# Patient Record
Sex: MALE | Race: WHITE | Employment: FULL TIME | URBAN - METROPOLITAN AREA
[De-identification: names, ages, dates, MRNs, and addresses within clinical notes are randomized per-mention and may not be internally consistent; named-entity substitution may affect disease eponyms.]

---

## 2018-01-01 ENCOUNTER — APPOINTMENT (OUTPATIENT)
Dept: CT IMAGING | Facility: CLINIC | Age: 53
End: 2018-01-01
Attending: EMERGENCY MEDICINE
Payer: COMMERCIAL

## 2018-01-01 ENCOUNTER — HOSPITAL ENCOUNTER (INPATIENT)
Facility: CLINIC | Age: 53
LOS: 37 days | Discharge: PSYCHIATRIC OR SUBSTANCE USE FACILITY | End: 2018-06-06
Attending: EMERGENCY MEDICINE | Admitting: PSYCHIATRY & NEUROLOGY
Payer: COMMERCIAL

## 2018-01-01 ENCOUNTER — HOSPITAL ENCOUNTER (INPATIENT)
Facility: CLINIC | Age: 53
LOS: 11 days | Discharge: PSYCHIATRIC OR SUBSTANCE USE FACILITY | End: 2018-04-11
Attending: EMERGENCY MEDICINE | Admitting: PSYCHIATRY & NEUROLOGY
Payer: COMMERCIAL

## 2018-01-01 VITALS
HEART RATE: 67 BPM | SYSTOLIC BLOOD PRESSURE: 111 MMHG | WEIGHT: 122.9 LBS | TEMPERATURE: 98.7 F | OXYGEN SATURATION: 96 % | DIASTOLIC BLOOD PRESSURE: 55 MMHG | BODY MASS INDEX: 20.48 KG/M2 | HEIGHT: 65 IN | RESPIRATION RATE: 16 BRPM

## 2018-01-01 VITALS
HEART RATE: 75 BPM | SYSTOLIC BLOOD PRESSURE: 116 MMHG | BODY MASS INDEX: 21.86 KG/M2 | TEMPERATURE: 98.7 F | RESPIRATION RATE: 17 BRPM | DIASTOLIC BLOOD PRESSURE: 70 MMHG | WEIGHT: 131.2 LBS | HEIGHT: 65 IN | OXYGEN SATURATION: 94 %

## 2018-01-01 DIAGNOSIS — Z72.0 TOBACCO ABUSE: ICD-10-CM

## 2018-01-01 DIAGNOSIS — R45.89 SUICIDAL BEHAVIOR: ICD-10-CM

## 2018-01-01 DIAGNOSIS — L85.3 DRY SKIN: ICD-10-CM

## 2018-01-01 DIAGNOSIS — F32.2 SEVERE SINGLE CURRENT EPISODE OF MAJOR DEPRESSIVE DISORDER, WITHOUT PSYCHOTIC FEATURES (H): ICD-10-CM

## 2018-01-01 DIAGNOSIS — S10.93XA CONTUSION OF NECK, INITIAL ENCOUNTER: ICD-10-CM

## 2018-01-01 DIAGNOSIS — F32.2 SEVERE SINGLE CURRENT EPISODE OF MAJOR DEPRESSIVE DISORDER, WITHOUT PSYCHOTIC FEATURES (H): Primary | ICD-10-CM

## 2018-01-01 DIAGNOSIS — F33.2 SEVERE RECURRENT MAJOR DEPRESSION WITHOUT PSYCHOTIC FEATURES (H): Primary | ICD-10-CM

## 2018-01-01 DIAGNOSIS — F41.9 ANXIETY: ICD-10-CM

## 2018-01-01 DIAGNOSIS — T71.162A SUICIDE ATTEMPT BY HANGING, INITIAL ENCOUNTER (H): ICD-10-CM

## 2018-01-01 LAB
ALBUMIN SERPL-MCNC: 3.6 G/DL (ref 3.4–5)
ALP SERPL-CCNC: 86 U/L (ref 40–150)
ALT SERPL W P-5'-P-CCNC: 17 U/L (ref 0–70)
AMPHETAMINES UR QL SCN: NEGATIVE
AMPHETAMINES UR QL SCN: NEGATIVE
ANION GAP SERPL CALCULATED.3IONS-SCNC: 6 MMOL/L (ref 3–14)
ANION GAP SERPL CALCULATED.3IONS-SCNC: 7 MMOL/L (ref 3–14)
APAP SERPL-MCNC: <2 MG/L (ref 10–20)
AST SERPL W P-5'-P-CCNC: 14 U/L (ref 0–45)
BARBITURATES UR QL: NEGATIVE
BARBITURATES UR QL: NEGATIVE
BASOPHILS # BLD AUTO: 0 10E9/L (ref 0–0.2)
BASOPHILS # BLD AUTO: 0 10E9/L (ref 0–0.2)
BASOPHILS NFR BLD AUTO: 0.2 %
BASOPHILS NFR BLD AUTO: 0.3 %
BENZODIAZ UR QL: NEGATIVE
BENZODIAZ UR QL: NEGATIVE
BILIRUB SERPL-MCNC: 0.5 MG/DL (ref 0.2–1.3)
BUN SERPL-MCNC: 15 MG/DL (ref 7–30)
BUN SERPL-MCNC: 9 MG/DL (ref 7–30)
CALCIUM SERPL-MCNC: 8.8 MG/DL (ref 8.5–10.1)
CALCIUM SERPL-MCNC: 9.5 MG/DL (ref 8.5–10.1)
CANNABINOIDS UR QL SCN: POSITIVE
CANNABINOIDS UR QL SCN: POSITIVE
CHLORIDE SERPL-SCNC: 102 MMOL/L (ref 94–109)
CHLORIDE SERPL-SCNC: 109 MMOL/L (ref 94–109)
CO2 SERPL-SCNC: 25 MMOL/L (ref 20–32)
CO2 SERPL-SCNC: 31 MMOL/L (ref 20–32)
COCAINE UR QL: NEGATIVE
COCAINE UR QL: NEGATIVE
CREAT SERPL-MCNC: 0.78 MG/DL (ref 0.66–1.25)
CREAT SERPL-MCNC: 1 MG/DL (ref 0.66–1.25)
DIFFERENTIAL METHOD BLD: ABNORMAL
DIFFERENTIAL METHOD BLD: NORMAL
EOSINOPHIL # BLD AUTO: 0 10E9/L (ref 0–0.7)
EOSINOPHIL # BLD AUTO: 0.1 10E9/L (ref 0–0.7)
EOSINOPHIL NFR BLD AUTO: 0.2 %
EOSINOPHIL NFR BLD AUTO: 0.8 %
ERYTHROCYTE [DISTWIDTH] IN BLOOD BY AUTOMATED COUNT: 13.8 % (ref 10–15)
ERYTHROCYTE [DISTWIDTH] IN BLOOD BY AUTOMATED COUNT: 13.8 % (ref 10–15)
ERYTHROCYTE [DISTWIDTH] IN BLOOD BY AUTOMATED COUNT: 14 % (ref 10–15)
ETHANOL SERPL-MCNC: <0.01 G/DL
GFR SERPL CREATININE-BSD FRML MDRD: 79 ML/MIN/1.7M2
GFR SERPL CREATININE-BSD FRML MDRD: >90 ML/MIN/1.7M2
GLUCOSE SERPL-MCNC: 89 MG/DL (ref 70–99)
GLUCOSE SERPL-MCNC: 96 MG/DL (ref 70–99)
HCT VFR BLD AUTO: 41.8 % (ref 40–53)
HCT VFR BLD AUTO: 41.8 % (ref 40–53)
HCT VFR BLD AUTO: 43.9 % (ref 40–53)
HGB BLD-MCNC: 14.2 G/DL (ref 13.3–17.7)
HGB BLD-MCNC: 14.7 G/DL (ref 13.3–17.7)
HGB BLD-MCNC: 15.4 G/DL (ref 13.3–17.7)
IMM GRANULOCYTES # BLD: 0 10E9/L (ref 0–0.4)
IMM GRANULOCYTES # BLD: 0.1 10E9/L (ref 0–0.4)
IMM GRANULOCYTES NFR BLD: 0.3 %
IMM GRANULOCYTES NFR BLD: 0.4 %
INTERPRETATION ECG - MUSE: NORMAL
LYMPHOCYTES # BLD AUTO: 1.3 10E9/L (ref 0.8–5.3)
LYMPHOCYTES # BLD AUTO: 1.4 10E9/L (ref 0.8–5.3)
LYMPHOCYTES NFR BLD AUTO: 16 %
LYMPHOCYTES NFR BLD AUTO: 9.7 %
MCH RBC QN AUTO: 30.9 PG (ref 26.5–33)
MCH RBC QN AUTO: 31.6 PG (ref 26.5–33)
MCH RBC QN AUTO: 31.7 PG (ref 26.5–33)
MCHC RBC AUTO-ENTMCNC: 34 G/DL (ref 31.5–36.5)
MCHC RBC AUTO-ENTMCNC: 35.1 G/DL (ref 31.5–36.5)
MCHC RBC AUTO-ENTMCNC: 35.2 G/DL (ref 31.5–36.5)
MCV RBC AUTO: 90 FL (ref 78–100)
MCV RBC AUTO: 90 FL (ref 78–100)
MCV RBC AUTO: 91 FL (ref 78–100)
MONOCYTES # BLD AUTO: 0.9 10E9/L (ref 0–1.3)
MONOCYTES # BLD AUTO: 1 10E9/L (ref 0–1.3)
MONOCYTES NFR BLD AUTO: 10.3 %
MONOCYTES NFR BLD AUTO: 7.2 %
NEUTROPHILS # BLD AUTO: 10.8 10E9/L (ref 1.6–8.3)
NEUTROPHILS # BLD AUTO: 6.5 10E9/L (ref 1.6–8.3)
NEUTROPHILS NFR BLD AUTO: 72.3 %
NEUTROPHILS NFR BLD AUTO: 82.3 %
NRBC # BLD AUTO: 0 10*3/UL
NRBC # BLD AUTO: 0 10*3/UL
NRBC BLD AUTO-RTO: 0 /100
NRBC BLD AUTO-RTO: 0 /100
OPIATES UR QL SCN: NEGATIVE
OPIATES UR QL SCN: NEGATIVE
PCP UR QL SCN: NEGATIVE
PCP UR QL SCN: NEGATIVE
PLATELET # BLD AUTO: 257 10E9/L (ref 150–450)
PLATELET # BLD AUTO: 258 10E9/L (ref 150–450)
PLATELET # BLD AUTO: 290 10E9/L (ref 150–450)
POTASSIUM SERPL-SCNC: 3.3 MMOL/L (ref 3.4–5.3)
POTASSIUM SERPL-SCNC: 4 MMOL/L (ref 3.4–5.3)
POTASSIUM SERPL-SCNC: 4.9 MMOL/L (ref 3.4–5.3)
PROT SERPL-MCNC: 6.8 G/DL (ref 6.8–8.8)
RBC # BLD AUTO: 4.59 10E12/L (ref 4.4–5.9)
RBC # BLD AUTO: 4.64 10E12/L (ref 4.4–5.9)
RBC # BLD AUTO: 4.87 10E12/L (ref 4.4–5.9)
SALICYLATES SERPL-MCNC: 4 MG/DL
SODIUM SERPL-SCNC: 139 MMOL/L (ref 133–144)
SODIUM SERPL-SCNC: 141 MMOL/L (ref 133–144)
TSH SERPL DL<=0.005 MIU/L-ACNC: 1.38 MU/L (ref 0.4–4)
TSH SERPL DL<=0.005 MIU/L-ACNC: 2.79 MU/L (ref 0.4–4)
WBC # BLD AUTO: 13.2 10E9/L (ref 4–11)
WBC # BLD AUTO: 8.5 10E9/L (ref 4–11)
WBC # BLD AUTO: 9 10E9/L (ref 4–11)

## 2018-01-01 PROCEDURE — 12400006 ZZH R&B MH INTERMEDIATE

## 2018-01-01 PROCEDURE — 25000132 ZZH RX MED GY IP 250 OP 250 PS 637: Performed by: NURSE PRACTITIONER

## 2018-01-01 PROCEDURE — 90853 GROUP PSYCHOTHERAPY: CPT

## 2018-01-01 PROCEDURE — 25000132 ZZH RX MED GY IP 250 OP 250 PS 637: Performed by: PSYCHIATRY & NEUROLOGY

## 2018-01-01 PROCEDURE — 36415 COLL VENOUS BLD VENIPUNCTURE: CPT | Performed by: PSYCHIATRY & NEUROLOGY

## 2018-01-01 PROCEDURE — 80307 DRUG TEST PRSMV CHEM ANLYZR: CPT | Performed by: PHYSICIAN ASSISTANT

## 2018-01-01 PROCEDURE — H0001 ALCOHOL AND/OR DRUG ASSESS: HCPCS

## 2018-01-01 PROCEDURE — 85025 COMPLETE CBC W/AUTO DIFF WBC: CPT | Performed by: NURSE PRACTITIONER

## 2018-01-01 PROCEDURE — 84443 ASSAY THYROID STIM HORMONE: CPT | Performed by: NURSE PRACTITIONER

## 2018-01-01 PROCEDURE — 90791 PSYCH DIAGNOSTIC EVALUATION: CPT

## 2018-01-01 PROCEDURE — 97150 GROUP THERAPEUTIC PROCEDURES: CPT | Mod: GO

## 2018-01-01 PROCEDURE — 80329 ANALGESICS NON-OPIOID 1 OR 2: CPT | Performed by: EMERGENCY MEDICINE

## 2018-01-01 PROCEDURE — 70498 CT ANGIOGRAPHY NECK: CPT

## 2018-01-01 PROCEDURE — 25000125 ZZHC RX 250: Performed by: NURSE PRACTITIONER

## 2018-01-01 PROCEDURE — 25000128 H RX IP 250 OP 636: Performed by: EMERGENCY MEDICINE

## 2018-01-01 PROCEDURE — 99223 1ST HOSP IP/OBS HIGH 75: CPT | Performed by: NURSE PRACTITIONER

## 2018-01-01 PROCEDURE — 25000125 ZZHC RX 250: Performed by: EMERGENCY MEDICINE

## 2018-01-01 PROCEDURE — 25000132 ZZH RX MED GY IP 250 OP 250 PS 637: Performed by: EMERGENCY MEDICINE

## 2018-01-01 PROCEDURE — 99285 EMERGENCY DEPT VISIT HI MDM: CPT | Mod: 25

## 2018-01-01 PROCEDURE — 80053 COMPREHEN METABOLIC PANEL: CPT | Performed by: EMERGENCY MEDICINE

## 2018-01-01 PROCEDURE — 99207 ZZC NON-BILLABLE SERV PER CHARTING: CPT | Performed by: PHYSICIAN ASSISTANT

## 2018-01-01 PROCEDURE — 36415 COLL VENOUS BLD VENIPUNCTURE: CPT | Performed by: NURSE PRACTITIONER

## 2018-01-01 PROCEDURE — 80048 BASIC METABOLIC PNL TOTAL CA: CPT | Performed by: PSYCHIATRY & NEUROLOGY

## 2018-01-01 PROCEDURE — 80307 DRUG TEST PRSMV CHEM ANLYZR: CPT | Performed by: EMERGENCY MEDICINE

## 2018-01-01 PROCEDURE — 72125 CT NECK SPINE W/O DYE: CPT

## 2018-01-01 PROCEDURE — 93010 ELECTROCARDIOGRAM REPORT: CPT | Performed by: INTERNAL MEDICINE

## 2018-01-01 PROCEDURE — 84443 ASSAY THYROID STIM HORMONE: CPT | Performed by: PSYCHIATRY & NEUROLOGY

## 2018-01-01 PROCEDURE — 84132 ASSAY OF SERUM POTASSIUM: CPT | Performed by: NURSE PRACTITIONER

## 2018-01-01 PROCEDURE — 85025 COMPLETE CBC W/AUTO DIFF WBC: CPT | Performed by: EMERGENCY MEDICINE

## 2018-01-01 PROCEDURE — 85027 COMPLETE CBC AUTOMATED: CPT | Performed by: PSYCHIATRY & NEUROLOGY

## 2018-01-01 PROCEDURE — 96374 THER/PROPH/DIAG INJ IV PUSH: CPT | Mod: 59

## 2018-01-01 PROCEDURE — 93005 ELECTROCARDIOGRAM TRACING: CPT

## 2018-01-01 PROCEDURE — 80320 DRUG SCREEN QUANTALCOHOLS: CPT | Performed by: EMERGENCY MEDICINE

## 2018-01-01 RX ORDER — LAMOTRIGINE 25 MG/1
25 TABLET ORAL DAILY
Status: DISCONTINUED | OUTPATIENT
Start: 2018-01-01 | End: 2018-01-01

## 2018-01-01 RX ORDER — LAMOTRIGINE 25 MG/1
50 TABLET ORAL ONCE
Status: COMPLETED | OUTPATIENT
Start: 2018-01-01 | End: 2018-01-01

## 2018-01-01 RX ORDER — VARENICLINE TARTRATE 0.5 MG/1
1 TABLET, FILM COATED ORAL 2 TIMES DAILY
Status: DISCONTINUED | OUTPATIENT
Start: 2018-01-01 | End: 2018-01-01 | Stop reason: HOSPADM

## 2018-01-01 RX ORDER — LAMOTRIGINE 100 MG/1
100 TABLET ORAL DAILY
Status: DISCONTINUED | OUTPATIENT
Start: 2018-01-01 | End: 2018-01-01

## 2018-01-01 RX ORDER — ACETAMINOPHEN 325 MG/1
650 TABLET ORAL EVERY 4 HOURS PRN
Status: DISCONTINUED | OUTPATIENT
Start: 2018-01-01 | End: 2018-01-01 | Stop reason: HOSPADM

## 2018-01-01 RX ORDER — QUETIAPINE FUMARATE 50 MG/1
50 TABLET, FILM COATED ORAL ONCE
Status: COMPLETED | OUTPATIENT
Start: 2018-01-01 | End: 2018-01-01

## 2018-01-01 RX ORDER — KETOROLAC TROMETHAMINE 30 MG/ML
15 INJECTION, SOLUTION INTRAMUSCULAR; INTRAVENOUS ONCE
Status: COMPLETED | OUTPATIENT
Start: 2018-01-01 | End: 2018-01-01

## 2018-01-01 RX ORDER — HYDROXYZINE HYDROCHLORIDE 25 MG/1
25 TABLET, FILM COATED ORAL EVERY 4 HOURS PRN
Status: DISCONTINUED | OUTPATIENT
Start: 2018-01-01 | End: 2018-01-01

## 2018-01-01 RX ORDER — HYDROCODONE BITARTRATE AND ACETAMINOPHEN 5; 325 MG/1; MG/1
2 TABLET ORAL ONCE
Status: COMPLETED | OUTPATIENT
Start: 2018-01-01 | End: 2018-01-01

## 2018-01-01 RX ORDER — QUETIAPINE 300 MG/1
300 TABLET, FILM COATED, EXTENDED RELEASE ORAL AT BEDTIME
Qty: 30 TABLET | Refills: 0 | Status: ON HOLD | OUTPATIENT
Start: 2018-01-01 | End: 2018-01-01

## 2018-01-01 RX ORDER — VARENICLINE TARTRATE 1 MG/1
1 TABLET, FILM COATED ORAL 2 TIMES DAILY
Qty: 60 TABLET | Refills: 0 | Status: SHIPPED | OUTPATIENT
Start: 2018-01-01

## 2018-01-01 RX ORDER — QUETIAPINE 150 MG/1
300 TABLET, FILM COATED, EXTENDED RELEASE ORAL AT BEDTIME
Status: DISCONTINUED | OUTPATIENT
Start: 2018-01-01 | End: 2018-01-01 | Stop reason: HOSPADM

## 2018-01-01 RX ORDER — VARENICLINE TARTRATE 1 MG/1
1 TABLET, FILM COATED ORAL 2 TIMES DAILY
Qty: 60 TABLET | Refills: 0 | Status: SHIPPED | OUTPATIENT
Start: 2018-01-01 | End: 2018-01-01

## 2018-01-01 RX ORDER — VARENICLINE TARTRATE 1 MG/1
1 TABLET, FILM COATED ORAL 2 TIMES DAILY
Qty: 60 TABLET | Refills: 0 | Status: ON HOLD | OUTPATIENT
Start: 2018-01-01 | End: 2018-01-01

## 2018-01-01 RX ORDER — QUETIAPINE FUMARATE 50 MG/1
50 TABLET, FILM COATED ORAL 3 TIMES DAILY PRN
Qty: 30 TABLET | Refills: 0 | Status: SHIPPED | OUTPATIENT
Start: 2018-01-01 | End: 2018-01-01

## 2018-01-01 RX ORDER — MIRTAZAPINE 45 MG/1
45 TABLET, FILM COATED ORAL AT BEDTIME
Qty: 30 TABLET | Refills: 0 | Status: SHIPPED | OUTPATIENT
Start: 2018-01-01

## 2018-01-01 RX ORDER — QUETIAPINE FUMARATE 50 MG/1
50 TABLET, FILM COATED ORAL 3 TIMES DAILY PRN
Status: DISCONTINUED | OUTPATIENT
Start: 2018-01-01 | End: 2018-01-01 | Stop reason: HOSPADM

## 2018-01-01 RX ORDER — MIRTAZAPINE 45 MG/1
45 TABLET, FILM COATED ORAL AT BEDTIME
Status: DISCONTINUED | OUTPATIENT
Start: 2018-01-01 | End: 2018-01-01 | Stop reason: HOSPADM

## 2018-01-01 RX ORDER — MIRTAZAPINE 45 MG/1
45 TABLET, FILM COATED ORAL AT BEDTIME
Qty: 30 TABLET | Refills: 0 | Status: ON HOLD | OUTPATIENT
Start: 2018-01-01 | End: 2018-01-01

## 2018-01-01 RX ORDER — QUETIAPINE 300 MG/1
300 TABLET, FILM COATED, EXTENDED RELEASE ORAL AT BEDTIME
Qty: 30 TABLET | Refills: 0 | Status: SHIPPED | OUTPATIENT
Start: 2018-01-01

## 2018-01-01 RX ORDER — ACETAMINOPHEN 500 MG
1000 TABLET ORAL EVERY 6 HOURS PRN
Status: DISCONTINUED | OUTPATIENT
Start: 2018-01-01 | End: 2018-01-01 | Stop reason: HOSPADM

## 2018-01-01 RX ORDER — QUETIAPINE FUMARATE 50 MG/1
50-100 TABLET, FILM COATED ORAL
Status: DISCONTINUED | OUTPATIENT
Start: 2018-01-01 | End: 2018-01-01 | Stop reason: HOSPADM

## 2018-01-01 RX ORDER — LAMOTRIGINE 150 MG/1
150 TABLET ORAL DAILY
Qty: 30 TABLET | Refills: 0 | Status: SHIPPED | OUTPATIENT
Start: 2018-01-01

## 2018-01-01 RX ORDER — IOPAMIDOL 755 MG/ML
70 INJECTION, SOLUTION INTRAVASCULAR ONCE
Status: COMPLETED | OUTPATIENT
Start: 2018-01-01 | End: 2018-01-01

## 2018-01-01 RX ORDER — MIRTAZAPINE 45 MG/1
45 TABLET, FILM COATED ORAL AT BEDTIME
Qty: 30 TABLET | Refills: 0 | Status: SHIPPED | OUTPATIENT
Start: 2018-01-01 | End: 2018-01-01

## 2018-01-01 RX ORDER — IBUPROFEN 600 MG/1
600 TABLET, FILM COATED ORAL ONCE
Status: COMPLETED | OUTPATIENT
Start: 2018-01-01 | End: 2018-01-01

## 2018-01-01 RX ORDER — BACITRACIN ZINC AND POLYMYXIN B SULFATE 500; 1000 [USP'U]/G; [USP'U]/G
OINTMENT TOPICAL 2 TIMES DAILY PRN
Status: DISCONTINUED | OUTPATIENT
Start: 2018-01-01 | End: 2018-01-01 | Stop reason: HOSPADM

## 2018-01-01 RX ORDER — QUETIAPINE 300 MG/1
300 TABLET, FILM COATED, EXTENDED RELEASE ORAL AT BEDTIME
Qty: 30 EACH | Refills: 0 | Status: SHIPPED | OUTPATIENT
Start: 2018-01-01 | End: 2018-01-01

## 2018-01-01 RX ORDER — QUETIAPINE FUMARATE 50 MG/1
50 TABLET, FILM COATED ORAL 3 TIMES DAILY PRN
Qty: 30 TABLET | Refills: 0 | Status: ON HOLD | OUTPATIENT
Start: 2018-01-01 | End: 2018-01-01

## 2018-01-01 RX ORDER — LORATADINE 10 MG/1
10 TABLET ORAL AT BEDTIME
Status: DISCONTINUED | OUTPATIENT
Start: 2018-01-01 | End: 2018-01-01 | Stop reason: HOSPADM

## 2018-01-01 RX ORDER — LAMOTRIGINE 25 MG/1
50 TABLET ORAL DAILY
Status: COMPLETED | OUTPATIENT
Start: 2018-01-01 | End: 2018-01-01

## 2018-01-01 RX ORDER — HYDROXYZINE HYDROCHLORIDE 25 MG/1
25 TABLET, FILM COATED ORAL EVERY 4 HOURS PRN
Status: DISCONTINUED | OUTPATIENT
Start: 2018-01-01 | End: 2018-01-01 | Stop reason: HOSPADM

## 2018-01-01 RX ADMIN — VARENICLINE TARTRATE 1 MG: 0.5 TABLET, FILM COATED ORAL at 21:39

## 2018-01-01 RX ADMIN — VARENICLINE TARTRATE 1 MG: 0.5 TABLET, FILM COATED ORAL at 21:29

## 2018-01-01 RX ADMIN — ACETAMINOPHEN 1000 MG: 500 TABLET, FILM COATED ORAL at 14:41

## 2018-01-01 RX ADMIN — VARENICLINE TARTRATE 1 MG: 0.5 TABLET, FILM COATED ORAL at 07:29

## 2018-01-01 RX ADMIN — LORATADINE 10 MG: 10 TABLET ORAL at 21:12

## 2018-01-01 RX ADMIN — VARENICLINE TARTRATE 1 MG: 0.5 TABLET, FILM COATED ORAL at 08:30

## 2018-01-01 RX ADMIN — VARENICLINE TARTRATE 1 MG: 0.5 TABLET, FILM COATED ORAL at 21:18

## 2018-01-01 RX ADMIN — MIRTAZAPINE 45 MG: 45 TABLET, FILM COATED ORAL at 22:10

## 2018-01-01 RX ADMIN — QUETIAPINE FUMARATE 300 MG: 150 TABLET, EXTENDED RELEASE ORAL at 19:00

## 2018-01-01 RX ADMIN — LAMOTRIGINE 50 MG: 25 TABLET ORAL at 07:41

## 2018-01-01 RX ADMIN — VARENICLINE TARTRATE 1 MG: 0.5 TABLET, FILM COATED ORAL at 09:37

## 2018-01-01 RX ADMIN — LORATADINE 10 MG: 10 TABLET ORAL at 21:39

## 2018-01-01 RX ADMIN — LAMOTRIGINE 50 MG: 25 TABLET ORAL at 07:39

## 2018-01-01 RX ADMIN — VARENICLINE TARTRATE 1 MG: 0.5 TABLET, FILM COATED ORAL at 07:56

## 2018-01-01 RX ADMIN — LAMOTRIGINE 50 MG: 25 TABLET ORAL at 07:36

## 2018-01-01 RX ADMIN — VARENICLINE TARTRATE 1 MG: 0.5 TABLET, FILM COATED ORAL at 07:41

## 2018-01-01 RX ADMIN — MIRTAZAPINE 45 MG: 45 TABLET, FILM COATED ORAL at 21:51

## 2018-01-01 RX ADMIN — QUETIAPINE FUMARATE 300 MG: 150 TABLET, EXTENDED RELEASE ORAL at 21:21

## 2018-01-01 RX ADMIN — VARENICLINE TARTRATE 1 MG: 0.5 TABLET, FILM COATED ORAL at 08:03

## 2018-01-01 RX ADMIN — VARENICLINE TARTRATE 1 MG: 0.5 TABLET, FILM COATED ORAL at 19:00

## 2018-01-01 RX ADMIN — QUETIAPINE FUMARATE 300 MG: 150 TABLET, EXTENDED RELEASE ORAL at 21:22

## 2018-01-01 RX ADMIN — LORATADINE 10 MG: 10 TABLET ORAL at 21:24

## 2018-01-01 RX ADMIN — VARENICLINE TARTRATE 1 MG: 0.5 TABLET, FILM COATED ORAL at 08:11

## 2018-01-01 RX ADMIN — LAMOTRIGINE 50 MG: 25 TABLET ORAL at 11:36

## 2018-01-01 RX ADMIN — QUETIAPINE FUMARATE 300 MG: 150 TABLET, EXTENDED RELEASE ORAL at 21:18

## 2018-01-01 RX ADMIN — VARENICLINE TARTRATE 1 MG: 0.5 TABLET, FILM COATED ORAL at 22:11

## 2018-01-01 RX ADMIN — SODIUM CHLORIDE 90 ML: 9 INJECTION, SOLUTION INTRAVENOUS at 20:47

## 2018-01-01 RX ADMIN — VARENICLINE TARTRATE 1 MG: 0.5 TABLET, FILM COATED ORAL at 07:57

## 2018-01-01 RX ADMIN — MIRTAZAPINE 45 MG: 45 TABLET, FILM COATED ORAL at 21:10

## 2018-01-01 RX ADMIN — MIRTAZAPINE 45 MG: 45 TABLET, FILM COATED ORAL at 20:29

## 2018-01-01 RX ADMIN — LAMOTRIGINE 100 MG: 100 TABLET ORAL at 08:03

## 2018-01-01 RX ADMIN — VARENICLINE TARTRATE 1 MG: 0.5 TABLET, FILM COATED ORAL at 08:26

## 2018-01-01 RX ADMIN — Medication 2 SPRAY: at 18:59

## 2018-01-01 RX ADMIN — IBUPROFEN 600 MG: 600 TABLET ORAL at 04:37

## 2018-01-01 RX ADMIN — Medication 2 SPRAY: at 19:30

## 2018-01-01 RX ADMIN — MIRTAZAPINE 45 MG: 45 TABLET, FILM COATED ORAL at 20:40

## 2018-01-01 RX ADMIN — VARENICLINE TARTRATE 1 MG: 0.5 TABLET, FILM COATED ORAL at 21:05

## 2018-01-01 RX ADMIN — Medication 2 SPRAY: at 14:59

## 2018-01-01 RX ADMIN — VARENICLINE TARTRATE 1 MG: 0.5 TABLET, FILM COATED ORAL at 07:38

## 2018-01-01 RX ADMIN — LORATADINE 10 MG: 10 TABLET ORAL at 20:43

## 2018-01-01 RX ADMIN — VARENICLINE TARTRATE 1 MG: 0.5 TABLET, FILM COATED ORAL at 22:03

## 2018-01-01 RX ADMIN — VARENICLINE TARTRATE 1 MG: 0.5 TABLET, FILM COATED ORAL at 22:40

## 2018-01-01 RX ADMIN — LAMOTRIGINE 25 MG: 25 TABLET ORAL at 08:19

## 2018-01-01 RX ADMIN — VARENICLINE TARTRATE 1 MG: 0.5 TABLET, FILM COATED ORAL at 21:24

## 2018-01-01 RX ADMIN — Medication 2 SPRAY: at 10:49

## 2018-01-01 RX ADMIN — VARENICLINE TARTRATE 1 MG: 0.5 TABLET, FILM COATED ORAL at 20:57

## 2018-01-01 RX ADMIN — LAMOTRIGINE 25 MG: 25 TABLET ORAL at 09:07

## 2018-01-01 RX ADMIN — QUETIAPINE FUMARATE 50 MG: 50 TABLET ORAL at 09:09

## 2018-01-01 RX ADMIN — LORATADINE 10 MG: 10 TABLET ORAL at 20:29

## 2018-01-01 RX ADMIN — MIRTAZAPINE 45 MG: 45 TABLET, FILM COATED ORAL at 22:11

## 2018-01-01 RX ADMIN — Medication 2 SPRAY: at 09:43

## 2018-01-01 RX ADMIN — LAMOTRIGINE 150 MG: 100 TABLET ORAL at 08:04

## 2018-01-01 RX ADMIN — Medication 1 SPRAY: at 16:03

## 2018-01-01 RX ADMIN — VARENICLINE TARTRATE 1 MG: 0.5 TABLET, FILM COATED ORAL at 08:18

## 2018-01-01 RX ADMIN — LAMOTRIGINE 50 MG: 25 TABLET ORAL at 07:56

## 2018-01-01 RX ADMIN — MIRTAZAPINE 45 MG: 45 TABLET, FILM COATED ORAL at 21:59

## 2018-01-01 RX ADMIN — VARENICLINE TARTRATE 1 MG: 0.5 TABLET, FILM COATED ORAL at 09:14

## 2018-01-01 RX ADMIN — ACETAMINOPHEN 1000 MG: 500 TABLET, FILM COATED ORAL at 20:02

## 2018-01-01 RX ADMIN — LAMOTRIGINE 100 MG: 100 TABLET ORAL at 09:00

## 2018-01-01 RX ADMIN — QUETIAPINE FUMARATE 50 MG: 50 TABLET ORAL at 19:20

## 2018-01-01 RX ADMIN — MIRTAZAPINE 45 MG: 45 TABLET, FILM COATED ORAL at 21:21

## 2018-01-01 RX ADMIN — LAMOTRIGINE 50 MG: 25 TABLET ORAL at 07:03

## 2018-01-01 RX ADMIN — MIRTAZAPINE 45 MG: 45 TABLET, FILM COATED ORAL at 21:24

## 2018-01-01 RX ADMIN — BACITRACIN ZINC AND POLYMYXIN B SULFATE: at 16:07

## 2018-01-01 RX ADMIN — MIRTAZAPINE 45 MG: 45 TABLET, FILM COATED ORAL at 21:38

## 2018-01-01 RX ADMIN — VARENICLINE TARTRATE 1 MG: 0.5 TABLET, FILM COATED ORAL at 08:19

## 2018-01-01 RX ADMIN — LORATADINE 10 MG: 10 TABLET ORAL at 22:11

## 2018-01-01 RX ADMIN — VARENICLINE TARTRATE 1 MG: 0.5 TABLET, FILM COATED ORAL at 21:34

## 2018-01-01 RX ADMIN — Medication 2 SPRAY: at 07:55

## 2018-01-01 RX ADMIN — QUETIAPINE FUMARATE 300 MG: 150 TABLET, EXTENDED RELEASE ORAL at 21:52

## 2018-01-01 RX ADMIN — QUETIAPINE FUMARATE 300 MG: 150 TABLET, EXTENDED RELEASE ORAL at 21:48

## 2018-01-01 RX ADMIN — Medication 1 SPRAY: at 17:09

## 2018-01-01 RX ADMIN — QUETIAPINE FUMARATE 300 MG: 150 TABLET, EXTENDED RELEASE ORAL at 21:10

## 2018-01-01 RX ADMIN — QUETIAPINE FUMARATE 50 MG: 50 TABLET ORAL at 11:24

## 2018-01-01 RX ADMIN — MIRTAZAPINE 45 MG: 45 TABLET, FILM COATED ORAL at 22:13

## 2018-01-01 RX ADMIN — Medication 2 SPRAY: at 19:10

## 2018-01-01 RX ADMIN — LAMOTRIGINE 25 MG: 25 TABLET ORAL at 07:56

## 2018-01-01 RX ADMIN — MIRTAZAPINE 45 MG: 45 TABLET, FILM COATED ORAL at 21:14

## 2018-01-01 RX ADMIN — MIRTAZAPINE 45 MG: 45 TABLET, FILM COATED ORAL at 21:12

## 2018-01-01 RX ADMIN — MIRTAZAPINE 45 MG: 45 TABLET, FILM COATED ORAL at 22:00

## 2018-01-01 RX ADMIN — VARENICLINE TARTRATE 1 MG: 0.5 TABLET, FILM COATED ORAL at 22:10

## 2018-01-01 RX ADMIN — Medication 2 SPRAY: at 12:16

## 2018-01-01 RX ADMIN — VARENICLINE TARTRATE 1 MG: 0.5 TABLET, FILM COATED ORAL at 09:16

## 2018-01-01 RX ADMIN — LORATADINE 10 MG: 10 TABLET ORAL at 21:29

## 2018-01-01 RX ADMIN — Medication 2 SPRAY: at 11:22

## 2018-01-01 RX ADMIN — IOPAMIDOL 70 ML: 755 INJECTION, SOLUTION INTRAVENOUS at 20:45

## 2018-01-01 RX ADMIN — Medication 2 SPRAY: at 21:32

## 2018-01-01 RX ADMIN — VARENICLINE TARTRATE 1 MG: 0.5 TABLET, FILM COATED ORAL at 21:51

## 2018-01-01 RX ADMIN — HYDROCODONE BITARTRATE AND ACETAMINOPHEN 2 TABLET: 5; 325 TABLET ORAL at 00:10

## 2018-01-01 RX ADMIN — ACETAMINOPHEN 650 MG: 325 TABLET, FILM COATED ORAL at 22:00

## 2018-01-01 RX ADMIN — VARENICLINE TARTRATE 1 MG: 0.5 TABLET, FILM COATED ORAL at 09:00

## 2018-01-01 RX ADMIN — VARENICLINE TARTRATE 1 MG: 0.5 TABLET, FILM COATED ORAL at 21:20

## 2018-01-01 RX ADMIN — Medication 1 SPRAY: at 10:51

## 2018-01-01 RX ADMIN — MIRTAZAPINE 45 MG: 45 TABLET, FILM COATED ORAL at 21:22

## 2018-01-01 RX ADMIN — QUETIAPINE FUMARATE 300 MG: 150 TABLET, EXTENDED RELEASE ORAL at 21:04

## 2018-01-01 RX ADMIN — QUETIAPINE FUMARATE 300 MG: 150 TABLET, EXTENDED RELEASE ORAL at 21:20

## 2018-01-01 RX ADMIN — VARENICLINE TARTRATE 1 MG: 0.5 TABLET, FILM COATED ORAL at 21:12

## 2018-01-01 RX ADMIN — VARENICLINE TARTRATE 1 MG: 0.5 TABLET, FILM COATED ORAL at 07:43

## 2018-01-01 RX ADMIN — Medication 2 SPRAY: at 07:03

## 2018-01-01 RX ADMIN — LAMOTRIGINE 50 MG: 25 TABLET ORAL at 07:33

## 2018-01-01 RX ADMIN — LORATADINE 10 MG: 10 TABLET ORAL at 20:40

## 2018-01-01 RX ADMIN — BACITRACIN ZINC AND POLYMYXIN B SULFATE: at 14:44

## 2018-01-01 RX ADMIN — MIRTAZAPINE 45 MG: 45 TABLET, FILM COATED ORAL at 22:03

## 2018-01-01 RX ADMIN — VARENICLINE TARTRATE 1 MG: 0.5 TABLET, FILM COATED ORAL at 07:30

## 2018-01-01 RX ADMIN — VARENICLINE TARTRATE 1 MG: 0.5 TABLET, FILM COATED ORAL at 09:06

## 2018-01-01 RX ADMIN — Medication 2 SPRAY: at 16:38

## 2018-01-01 RX ADMIN — VARENICLINE TARTRATE 1 MG: 0.5 TABLET, FILM COATED ORAL at 08:13

## 2018-01-01 RX ADMIN — MIRTAZAPINE 45 MG: 45 TABLET, FILM COATED ORAL at 21:28

## 2018-01-01 RX ADMIN — QUETIAPINE FUMARATE 50 MG: 50 TABLET, FILM COATED ORAL at 05:04

## 2018-01-01 RX ADMIN — LAMOTRIGINE 100 MG: 100 TABLET ORAL at 07:38

## 2018-01-01 RX ADMIN — MIRTAZAPINE 45 MG: 45 TABLET, FILM COATED ORAL at 21:05

## 2018-01-01 RX ADMIN — VARENICLINE TARTRATE 1 MG: 0.5 TABLET, FILM COATED ORAL at 21:10

## 2018-01-01 RX ADMIN — VARENICLINE TARTRATE 1 MG: 0.5 TABLET, FILM COATED ORAL at 21:47

## 2018-01-01 RX ADMIN — QUETIAPINE FUMARATE 300 MG: 150 TABLET, EXTENDED RELEASE ORAL at 22:10

## 2018-01-01 RX ADMIN — Medication 2 SPRAY: at 13:26

## 2018-01-01 RX ADMIN — VARENICLINE TARTRATE 1 MG: 0.5 TABLET, FILM COATED ORAL at 21:22

## 2018-01-01 RX ADMIN — LAMOTRIGINE 50 MG: 25 TABLET ORAL at 08:18

## 2018-01-01 RX ADMIN — Medication 2 SPRAY: at 21:30

## 2018-01-01 RX ADMIN — MIRTAZAPINE 45 MG: 45 TABLET, FILM COATED ORAL at 21:13

## 2018-01-01 RX ADMIN — QUETIAPINE FUMARATE 300 MG: 150 TABLET, EXTENDED RELEASE ORAL at 20:25

## 2018-01-01 RX ADMIN — QUETIAPINE FUMARATE 300 MG: 150 TABLET, EXTENDED RELEASE ORAL at 22:09

## 2018-01-01 RX ADMIN — VARENICLINE TARTRATE 1 MG: 0.5 TABLET, FILM COATED ORAL at 21:31

## 2018-01-01 RX ADMIN — VARENICLINE TARTRATE 1 MG: 0.5 TABLET, FILM COATED ORAL at 08:46

## 2018-01-01 RX ADMIN — Medication 2 SPRAY: at 14:54

## 2018-01-01 RX ADMIN — QUETIAPINE FUMARATE 300 MG: 150 TABLET, EXTENDED RELEASE ORAL at 21:05

## 2018-01-01 RX ADMIN — LAMOTRIGINE 25 MG: 25 TABLET ORAL at 08:27

## 2018-01-01 RX ADMIN — Medication 2 SPRAY: at 19:46

## 2018-01-01 RX ADMIN — QUETIAPINE FUMARATE 300 MG: 150 TABLET, EXTENDED RELEASE ORAL at 22:40

## 2018-01-01 RX ADMIN — LORATADINE 10 MG: 10 TABLET ORAL at 20:57

## 2018-01-01 RX ADMIN — QUETIAPINE FUMARATE 300 MG: 150 TABLET, EXTENDED RELEASE ORAL at 21:14

## 2018-01-01 RX ADMIN — VARENICLINE TARTRATE 1 MG: 0.5 TABLET, FILM COATED ORAL at 21:13

## 2018-01-01 RX ADMIN — Medication 2 SPRAY: at 14:23

## 2018-01-01 RX ADMIN — Medication 2 SPRAY: at 06:51

## 2018-01-01 RX ADMIN — QUETIAPINE FUMARATE 300 MG: 150 TABLET, EXTENDED RELEASE ORAL at 20:43

## 2018-01-01 RX ADMIN — MIRTAZAPINE 45 MG: 45 TABLET, FILM COATED ORAL at 21:39

## 2018-01-01 RX ADMIN — LORATADINE 10 MG: 10 TABLET ORAL at 20:26

## 2018-01-01 RX ADMIN — LORATADINE 10 MG: 10 TABLET ORAL at 21:47

## 2018-01-01 RX ADMIN — Medication 2 SPRAY: at 21:10

## 2018-01-01 RX ADMIN — QUETIAPINE FUMARATE 300 MG: 150 TABLET, EXTENDED RELEASE ORAL at 21:55

## 2018-01-01 RX ADMIN — Medication 2 SPRAY: at 08:20

## 2018-01-01 RX ADMIN — MIRTAZAPINE 45 MG: 45 TABLET, FILM COATED ORAL at 21:29

## 2018-01-01 RX ADMIN — VARENICLINE TARTRATE 1 MG: 0.5 TABLET, FILM COATED ORAL at 20:43

## 2018-01-01 RX ADMIN — VARENICLINE TARTRATE 1 MG: 0.5 TABLET, FILM COATED ORAL at 21:04

## 2018-01-01 RX ADMIN — LAMOTRIGINE 25 MG: 25 TABLET ORAL at 08:15

## 2018-01-01 RX ADMIN — BACITRACIN ZINC AND POLYMYXIN B SULFATE: at 09:43

## 2018-01-01 RX ADMIN — LAMOTRIGINE 50 MG: 25 TABLET ORAL at 07:43

## 2018-01-01 RX ADMIN — LAMOTRIGINE 50 MG: 25 TABLET ORAL at 07:34

## 2018-01-01 RX ADMIN — MIRTAZAPINE 45 MG: 45 TABLET, FILM COATED ORAL at 21:37

## 2018-01-01 RX ADMIN — LORATADINE 10 MG: 10 TABLET ORAL at 18:59

## 2018-01-01 RX ADMIN — VARENICLINE TARTRATE 1 MG: 0.5 TABLET, FILM COATED ORAL at 07:03

## 2018-01-01 RX ADMIN — QUETIAPINE FUMARATE 300 MG: 150 TABLET, EXTENDED RELEASE ORAL at 20:29

## 2018-01-01 RX ADMIN — Medication 2 SPRAY: at 14:36

## 2018-01-01 RX ADMIN — VARENICLINE TARTRATE 1 MG: 0.5 TABLET, FILM COATED ORAL at 20:29

## 2018-01-01 RX ADMIN — VARENICLINE TARTRATE 1 MG: 0.5 TABLET, FILM COATED ORAL at 21:59

## 2018-01-01 RX ADMIN — VARENICLINE TARTRATE 1 MG: 0.5 TABLET, FILM COATED ORAL at 21:27

## 2018-01-01 RX ADMIN — MIRTAZAPINE 45 MG: 45 TABLET, FILM COATED ORAL at 21:31

## 2018-01-01 RX ADMIN — LORATADINE 10 MG: 10 TABLET ORAL at 22:10

## 2018-01-01 RX ADMIN — LAMOTRIGINE 50 MG: 25 TABLET ORAL at 07:15

## 2018-01-01 RX ADMIN — MIRTAZAPINE 45 MG: 45 TABLET, FILM COATED ORAL at 21:25

## 2018-01-01 RX ADMIN — KETOROLAC TROMETHAMINE 15 MG: 30 INJECTION, SOLUTION INTRAMUSCULAR at 22:14

## 2018-01-01 RX ADMIN — VARENICLINE TARTRATE 1 MG: 0.5 TABLET, FILM COATED ORAL at 07:15

## 2018-01-01 RX ADMIN — MIRTAZAPINE 45 MG: 45 TABLET, FILM COATED ORAL at 21:54

## 2018-01-01 RX ADMIN — LAMOTRIGINE 25 MG: 25 TABLET ORAL at 07:52

## 2018-01-01 RX ADMIN — LORATADINE 10 MG: 10 TABLET ORAL at 21:50

## 2018-01-01 RX ADMIN — VARENICLINE TARTRATE 1 MG: 0.5 TABLET, FILM COATED ORAL at 09:18

## 2018-01-01 RX ADMIN — Medication 1 SPRAY: at 08:17

## 2018-01-01 RX ADMIN — HYDROXYZINE HYDROCHLORIDE 25 MG: 25 TABLET ORAL at 18:17

## 2018-01-01 RX ADMIN — MIRTAZAPINE 45 MG: 45 TABLET, FILM COATED ORAL at 21:47

## 2018-01-01 RX ADMIN — QUETIAPINE FUMARATE 300 MG: 150 TABLET, EXTENDED RELEASE ORAL at 22:13

## 2018-01-01 RX ADMIN — LAMOTRIGINE 50 MG: 25 TABLET ORAL at 07:52

## 2018-01-01 RX ADMIN — VARENICLINE TARTRATE 1 MG: 0.5 TABLET, FILM COATED ORAL at 06:53

## 2018-01-01 RX ADMIN — QUETIAPINE FUMARATE 300 MG: 150 TABLET, EXTENDED RELEASE ORAL at 00:11

## 2018-01-01 RX ADMIN — QUETIAPINE FUMARATE 300 MG: 150 TABLET, EXTENDED RELEASE ORAL at 21:54

## 2018-01-01 RX ADMIN — VARENICLINE TARTRATE 1 MG: 0.5 TABLET, FILM COATED ORAL at 07:52

## 2018-01-01 RX ADMIN — VARENICLINE TARTRATE 1 MG: 0.5 TABLET, FILM COATED ORAL at 07:36

## 2018-01-01 RX ADMIN — MIRTAZAPINE 45 MG: 45 TABLET, FILM COATED ORAL at 21:18

## 2018-01-01 RX ADMIN — LORATADINE 10 MG: 10 TABLET ORAL at 21:52

## 2018-01-01 RX ADMIN — LAMOTRIGINE 25 MG: 25 TABLET ORAL at 09:11

## 2018-01-01 RX ADMIN — VARENICLINE TARTRATE 1 MG: 0.5 TABLET, FILM COATED ORAL at 08:04

## 2018-01-01 RX ADMIN — LAMOTRIGINE 100 MG: 100 TABLET ORAL at 08:18

## 2018-01-01 RX ADMIN — LAMOTRIGINE 25 MG: 25 TABLET ORAL at 08:30

## 2018-01-01 RX ADMIN — QUETIAPINE FUMARATE 300 MG: 150 TABLET, EXTENDED RELEASE ORAL at 20:39

## 2018-01-01 RX ADMIN — LAMOTRIGINE 100 MG: 100 TABLET ORAL at 08:30

## 2018-01-01 RX ADMIN — VARENICLINE TARTRATE 1 MG: 0.5 TABLET, FILM COATED ORAL at 22:09

## 2018-01-01 RX ADMIN — Medication 1 SPRAY: at 08:01

## 2018-01-01 RX ADMIN — LAMOTRIGINE 100 MG: 100 TABLET ORAL at 08:11

## 2018-01-01 RX ADMIN — BACITRACIN ZINC AND POLYMYXIN B SULFATE: at 21:15

## 2018-01-01 RX ADMIN — LORATADINE 10 MG: 10 TABLET ORAL at 21:10

## 2018-01-01 RX ADMIN — VARENICLINE TARTRATE 1 MG: 0.5 TABLET, FILM COATED ORAL at 21:55

## 2018-01-01 RX ADMIN — QUETIAPINE FUMARATE 300 MG: 150 TABLET, EXTENDED RELEASE ORAL at 21:38

## 2018-01-01 RX ADMIN — Medication 2 SPRAY: at 07:29

## 2018-01-01 RX ADMIN — QUETIAPINE FUMARATE 100 MG: 50 TABLET ORAL at 09:38

## 2018-01-01 RX ADMIN — VARENICLINE TARTRATE 1 MG: 0.5 TABLET, FILM COATED ORAL at 21:54

## 2018-01-01 RX ADMIN — LORATADINE 10 MG: 10 TABLET ORAL at 21:51

## 2018-01-01 RX ADMIN — QUETIAPINE FUMARATE 300 MG: 150 TABLET, EXTENDED RELEASE ORAL at 22:00

## 2018-01-01 RX ADMIN — QUETIAPINE FUMARATE 300 MG: 150 TABLET, EXTENDED RELEASE ORAL at 21:51

## 2018-01-01 RX ADMIN — VARENICLINE TARTRATE 1 MG: 0.5 TABLET, FILM COATED ORAL at 07:33

## 2018-01-01 RX ADMIN — VARENICLINE TARTRATE 1 MG: 0.5 TABLET, FILM COATED ORAL at 08:00

## 2018-01-01 RX ADMIN — VARENICLINE TARTRATE 1 MG: 0.5 TABLET, FILM COATED ORAL at 21:48

## 2018-01-01 RX ADMIN — ACETAMINOPHEN 1000 MG: 500 TABLET, FILM COATED ORAL at 08:19

## 2018-01-01 RX ADMIN — Medication 2 SPRAY: at 08:51

## 2018-01-01 RX ADMIN — LAMOTRIGINE 50 MG: 25 TABLET ORAL at 08:11

## 2018-01-01 RX ADMIN — VARENICLINE TARTRATE 1 MG: 0.5 TABLET, FILM COATED ORAL at 20:26

## 2018-01-01 RX ADMIN — QUETIAPINE FUMARATE 300 MG: 150 TABLET, EXTENDED RELEASE ORAL at 21:25

## 2018-01-01 RX ADMIN — Medication 2 SPRAY: at 20:00

## 2018-01-01 RX ADMIN — ACETAMINOPHEN 650 MG: 325 TABLET, FILM COATED ORAL at 20:22

## 2018-01-01 RX ADMIN — VARENICLINE TARTRATE 1 MG: 0.5 TABLET, FILM COATED ORAL at 08:10

## 2018-01-01 RX ADMIN — MIRTAZAPINE 45 MG: 45 TABLET, FILM COATED ORAL at 21:20

## 2018-01-01 RX ADMIN — Medication 2 SPRAY: at 18:30

## 2018-01-01 RX ADMIN — LORATADINE 10 MG: 10 TABLET ORAL at 21:05

## 2018-01-01 RX ADMIN — QUETIAPINE FUMARATE 300 MG: 150 TABLET, EXTENDED RELEASE ORAL at 21:13

## 2018-01-01 RX ADMIN — LAMOTRIGINE 25 MG: 25 TABLET ORAL at 08:13

## 2018-01-01 RX ADMIN — QUETIAPINE FUMARATE 300 MG: 150 TABLET, EXTENDED RELEASE ORAL at 21:11

## 2018-01-01 RX ADMIN — Medication 2 SPRAY: at 22:23

## 2018-01-01 RX ADMIN — QUETIAPINE FUMARATE 300 MG: 150 TABLET, EXTENDED RELEASE ORAL at 22:11

## 2018-01-01 RX ADMIN — Medication 2 SPRAY: at 16:43

## 2018-01-01 RX ADMIN — QUETIAPINE FUMARATE 300 MG: 150 TABLET, EXTENDED RELEASE ORAL at 21:31

## 2018-01-01 RX ADMIN — QUETIAPINE FUMARATE 300 MG: 150 TABLET, EXTENDED RELEASE ORAL at 21:29

## 2018-01-01 RX ADMIN — Medication 1 SPRAY: at 17:02

## 2018-01-01 RX ADMIN — VARENICLINE TARTRATE 1 MG: 0.5 TABLET, FILM COATED ORAL at 08:07

## 2018-01-01 RX ADMIN — VARENICLINE TARTRATE 1 MG: 0.5 TABLET, FILM COATED ORAL at 00:11

## 2018-01-01 RX ADMIN — Medication 2 SPRAY: at 09:00

## 2018-01-01 RX ADMIN — MIRTAZAPINE 45 MG: 45 TABLET, FILM COATED ORAL at 20:43

## 2018-01-01 RX ADMIN — MIRTAZAPINE 45 MG: 45 TABLET, FILM COATED ORAL at 21:55

## 2018-01-01 RX ADMIN — LORATADINE 10 MG: 10 TABLET ORAL at 21:48

## 2018-01-01 RX ADMIN — VARENICLINE TARTRATE 1 MG: 0.5 TABLET, FILM COATED ORAL at 09:15

## 2018-01-01 RX ADMIN — VARENICLINE TARTRATE 1 MG: 0.5 TABLET, FILM COATED ORAL at 22:01

## 2018-01-01 RX ADMIN — VARENICLINE TARTRATE 1 MG: 0.5 TABLET, FILM COATED ORAL at 20:39

## 2018-01-01 RX ADMIN — LAMOTRIGINE 25 MG: 25 TABLET ORAL at 07:30

## 2018-01-01 RX ADMIN — Medication 2 SPRAY: at 16:21

## 2018-01-01 RX ADMIN — MIRTAZAPINE 45 MG: 45 TABLET, FILM COATED ORAL at 00:12

## 2018-01-01 RX ADMIN — VARENICLINE TARTRATE 1 MG: 0.5 TABLET, FILM COATED ORAL at 21:46

## 2018-01-01 RX ADMIN — VARENICLINE TARTRATE 1 MG: 0.5 TABLET, FILM COATED ORAL at 07:34

## 2018-01-01 RX ADMIN — QUETIAPINE FUMARATE 50 MG: 50 TABLET ORAL at 11:20

## 2018-01-01 RX ADMIN — LAMOTRIGINE 25 MG: 25 TABLET ORAL at 08:00

## 2018-01-01 RX ADMIN — LAMOTRIGINE 25 MG: 25 TABLET ORAL at 07:57

## 2018-01-01 RX ADMIN — QUETIAPINE FUMARATE 300 MG: 150 TABLET, EXTENDED RELEASE ORAL at 21:59

## 2018-01-01 RX ADMIN — VARENICLINE TARTRATE 1 MG: 0.5 TABLET, FILM COATED ORAL at 21:38

## 2018-01-01 RX ADMIN — LORATADINE 10 MG: 10 TABLET ORAL at 21:04

## 2018-01-01 RX ADMIN — VARENICLINE TARTRATE 1 MG: 0.5 TABLET, FILM COATED ORAL at 22:13

## 2018-01-01 RX ADMIN — VARENICLINE TARTRATE 1 MG: 0.5 TABLET, FILM COATED ORAL at 08:35

## 2018-01-01 RX ADMIN — MIRTAZAPINE 45 MG: 45 TABLET, FILM COATED ORAL at 20:57

## 2018-01-01 RX ADMIN — Medication 2 SPRAY: at 07:15

## 2018-01-01 RX ADMIN — QUETIAPINE FUMARATE 300 MG: 150 TABLET, EXTENDED RELEASE ORAL at 21:28

## 2018-01-01 RX ADMIN — Medication 1 SPRAY: at 08:40

## 2018-01-01 RX ADMIN — QUETIAPINE FUMARATE 300 MG: 150 TABLET, EXTENDED RELEASE ORAL at 21:47

## 2018-01-01 RX ADMIN — BACITRACIN ZINC AND POLYMYXIN B SULFATE: at 08:13

## 2018-01-01 RX ADMIN — Medication 2 SPRAY: at 07:08

## 2018-01-01 RX ADMIN — Medication 2 SPRAY: at 08:05

## 2018-01-01 RX ADMIN — MIRTAZAPINE 45 MG: 45 TABLET, FILM COATED ORAL at 21:52

## 2018-01-01 RX ADMIN — VARENICLINE TARTRATE 1 MG: 0.5 TABLET, FILM COATED ORAL at 21:14

## 2018-01-01 RX ADMIN — Medication 2 SPRAY: at 19:59

## 2018-01-01 RX ADMIN — LAMOTRIGINE 100 MG: 100 TABLET ORAL at 07:51

## 2018-01-01 RX ADMIN — MIRTAZAPINE 45 MG: 45 TABLET, FILM COATED ORAL at 21:48

## 2018-01-01 RX ADMIN — Medication 1 SPRAY: at 17:43

## 2018-01-01 RX ADMIN — LORATADINE 10 MG: 10 TABLET ORAL at 21:14

## 2018-01-01 RX ADMIN — VARENICLINE TARTRATE 1 MG: 0.5 TABLET, FILM COATED ORAL at 07:50

## 2018-01-01 RX ADMIN — LORATADINE 10 MG: 10 TABLET ORAL at 21:18

## 2018-01-01 RX ADMIN — VARENICLINE TARTRATE 1 MG: 0.5 TABLET, FILM COATED ORAL at 08:41

## 2018-01-01 RX ADMIN — QUETIAPINE FUMARATE 300 MG: 150 TABLET, EXTENDED RELEASE ORAL at 21:39

## 2018-01-01 RX ADMIN — ACETAMINOPHEN 1000 MG: 500 TABLET, FILM COATED ORAL at 20:18

## 2018-01-01 RX ADMIN — LAMOTRIGINE 25 MG: 25 TABLET ORAL at 08:11

## 2018-01-01 RX ADMIN — MIRTAZAPINE 45 MG: 45 TABLET, FILM COATED ORAL at 18:59

## 2018-01-01 RX ADMIN — Medication 2 SPRAY: at 20:37

## 2018-01-01 RX ADMIN — LAMOTRIGINE 25 MG: 25 TABLET ORAL at 08:46

## 2018-01-01 RX ADMIN — QUETIAPINE FUMARATE 300 MG: 150 TABLET, EXTENDED RELEASE ORAL at 21:34

## 2018-01-01 RX ADMIN — QUETIAPINE FUMARATE 300 MG: 150 TABLET, EXTENDED RELEASE ORAL at 22:03

## 2018-01-01 RX ADMIN — MIRTAZAPINE 45 MG: 45 TABLET, FILM COATED ORAL at 20:26

## 2018-01-01 RX ADMIN — QUETIAPINE FUMARATE 300 MG: 150 TABLET, EXTENDED RELEASE ORAL at 20:57

## 2018-01-01 RX ADMIN — QUETIAPINE FUMARATE 50 MG: 50 TABLET ORAL at 14:53

## 2018-01-01 RX ADMIN — Medication 2 SPRAY: at 08:12

## 2018-01-01 RX ADMIN — VARENICLINE TARTRATE 1 MG: 0.5 TABLET, FILM COATED ORAL at 07:40

## 2018-01-01 RX ADMIN — LORATADINE 10 MG: 10 TABLET ORAL at 21:54

## 2018-01-01 RX ADMIN — Medication 2 SPRAY: at 08:42

## 2018-01-01 RX ADMIN — VARENICLINE TARTRATE 1 MG: 0.5 TABLET, FILM COATED ORAL at 21:25

## 2018-01-01 RX ADMIN — LAMOTRIGINE 25 MG: 25 TABLET ORAL at 06:53

## 2018-01-01 RX ADMIN — VARENICLINE TARTRATE 1 MG: 0.5 TABLET, FILM COATED ORAL at 08:15

## 2018-01-01 RX ADMIN — QUETIAPINE FUMARATE 300 MG: 150 TABLET, EXTENDED RELEASE ORAL at 21:24

## 2018-01-01 RX ADMIN — MIRTAZAPINE 45 MG: 45 TABLET, FILM COATED ORAL at 21:04

## 2018-01-01 RX ADMIN — VARENICLINE TARTRATE 1 MG: 0.5 TABLET, FILM COATED ORAL at 21:52

## 2018-01-01 RX ADMIN — LORATADINE 10 MG: 10 TABLET ORAL at 22:03

## 2018-01-01 RX ADMIN — MIRTAZAPINE 45 MG: 45 TABLET, FILM COATED ORAL at 22:40

## 2018-01-01 RX ADMIN — Medication 2 SPRAY: at 09:50

## 2018-01-01 ASSESSMENT — ACTIVITIES OF DAILY LIVING (ADL)
RETIRED_EATING: 0-->INDEPENDENT
DRESS: STREET CLOTHES;INDEPENDENT
DRESS: STREET CLOTHES
GROOMING: INDEPENDENT
COMMUNICATION: 0 - UNDERSTANDS/COMMUNICATES WITHOUT DIFFICULTY
ORAL_HYGIENE: INDEPENDENT
DRESS: INDEPENDENT
TOILETING: 0-->INDEPENDENT
GROOMING: INDEPENDENT
LAUNDRY: WITH SUPERVISION
EATING: 0 - INDEPENDENT
ORAL_HYGIENE: INDEPENDENT
LAUNDRY: WITH SUPERVISION
COGNITION: 0 - NO COGNITION ISSUES REPORTED
AMBULATION: 0-->INDEPENDENT
TOILETING: 0 - INDEPENDENT
ORAL_HYGIENE: INDEPENDENT
ORAL_HYGIENE: INDEPENDENT
RETIRED_COMMUNICATION: 0-->UNDERSTANDS/COMMUNICATES WITHOUT DIFFICULTY
DRESS: STREET CLOTHES;INDEPENDENT
ORAL_HYGIENE: INDEPENDENT
DRESS: STREET CLOTHES
SWALLOWING: 0-->SWALLOWS FOODS/LIQUIDS WITHOUT DIFFICULTY
HYGIENE/GROOMING: INDEPENDENT
DRESS: INDEPENDENT
TRANSFERRING: 0 - INDEPENDENT
FALL_HISTORY_WITHIN_LAST_SIX_MONTHS: NO
DRESS: INDEPENDENT
BATHING: 0-->INDEPENDENT
ORAL_HYGIENE: INDEPENDENT
DRESS: 0-->INDEPENDENT
GROOMING: INDEPENDENT
DRESS: INDEPENDENT
DRESS: 0 - INDEPENDENT
LAUNDRY: WITH SUPERVISION
BATHING: 0 - INDEPENDENT
ORAL_HYGIENE: INDEPENDENT
LAUNDRY: WITH SUPERVISION
SWALLOWING: 0 - SWALLOWS FOODS/LIQUIDS WITHOUT DIFFICULTY
GROOMING: INDEPENDENT
ORAL_HYGIENE: INDEPENDENT
ORAL_HYGIENE: INDEPENDENT
GROOMING: INDEPENDENT
DRESS: INDEPENDENT
GROOMING: INDEPENDENT
GROOMING: INDEPENDENT
DRESS: INDEPENDENT
ORAL_HYGIENE: INDEPENDENT
GROOMING: INDEPENDENT
ORAL_HYGIENE: INDEPENDENT
GROOMING: INDEPENDENT
LAUNDRY: WITH SUPERVISION
TRANSFERRING: 0-->INDEPENDENT
GROOMING: INDEPENDENT
HYGIENE/GROOMING: INDEPENDENT
DRESS: INDEPENDENT
GROOMING: INDEPENDENT
AMBULATION: 0 - INDEPENDENT
DRESS: INDEPENDENT
LAUNDRY: WITH SUPERVISION
DRESS: INDEPENDENT

## 2018-01-01 ASSESSMENT — ENCOUNTER SYMPTOMS
ANAL BLEEDING: 0
HALLUCINATIONS: 0
CHILLS: 0
NAUSEA: 0
WEAKNESS: 0
NECK PAIN: 1
COUGH: 0
HEADACHES: 0
DYSPHORIC MOOD: 1
NUMBNESS: 0
VOMITING: 0
SHORTNESS OF BREATH: 0
HEADACHES: 1
ABDOMINAL PAIN: 0
NAUSEA: 0
SHORTNESS OF BREATH: 0
PALPITATIONS: 0

## 2018-01-01 ASSESSMENT — PAIN DESCRIPTION - DESCRIPTORS
DESCRIPTORS: SORE
DESCRIPTORS: TINGLING

## 2018-03-31 PROBLEM — R45.851 SUICIDAL IDEATION: Status: ACTIVE | Noted: 2018-01-01

## 2018-03-31 NOTE — IP AVS SNAPSHOT
MRN:[id number]                      After Visit Summary   3/31/2018    Raheem Jimenez    MRN: [id number]           Thank you!     Thank you for choosing [facility] for your care. Our goal is always to provide you with excellent care.        Patient Information     Date Of Birth          1965        Designated Caregiver       Most Recent Value    Caregiver    Will someone help with your care after discharge? no      About your hospital stay     You were admitted on:  March 31, 2018 You last received care in the:  [facility]    You were discharged on:  April 11, 2018       Who to Call     For medical emergencies, please call 911.  For non-urgent questions about your medical care, please call your primary care provider or clinic, [phone]          Attending Provider     Provider Specialty    Vita Lawrence MD Emergency Medicine    Steve Khan MD Psychiatry       Primary Care Provider Office Phone # Fax #    Julia Burgess [phone]      Further instructions from your care team       Behavioral Discharge Planning and Instructions    Summary: Admitted to hospital with suicidal ideation after relapse.    Main Diagnosis: Major depression; polysubstance use disorder     Major Treatments, Procedures and Findings:psychiatric assessment; chemical health assessment    Symptoms to Report: mood getting worse or thoughts of suicide    Lifestyle Adjustment: Sober lifestyle. Develop and follow safety plan. Follow up with co-occurring treatment program and follow their recommendations for aftercare.      Psychiatry Follow-up:     [facility]- entry date 4/11/18.  [address]   [phone] fax:[phone]    Dr. Khan   - will follow you in program and you can set up appointments with him as you prepare to discharge.     [facility]  [address], [facility]  [address]  [address]  Phone:  [phone]  Fax:  [phone]    Resources:   Crisis Intervention: 827.148.3064 or 157-670-3731 (TTY: 311.644.1518).  Call anytime for help.  National [place] on Mental Illness (www.[place].kita.org): 295.396.2490 or 670-832-2515.  Alcoholics Anonymous (www.alcoholics-anonymous.org): Check your phone book for your local chapter.  National Suicide Prevention Line (www.mentalNonWoTecc Medicalmn.org): 915-628-QDQI (6668)    General Medication Instructions:   See your medication sheet(s) for instructions.   Take all medicines as directed.  Make no changes unless your doctor suggests them.   Go to all your doctor visits.  Be sure to have all your required lab tests. This way, your medicines can be refilled on time.  Do not use any drugs not prescribed by your doctor.  Avoid alcohol.      Pending Results     No orders found from 3/29/2018 to 4/1/2018.            Statement of Approval     Ordered          04/11/18 0809  I have reviewed and agree with all the recommendations and orders detailed in this document.  EFFECTIVE NOW     Associated Diagnoses:  Severe single current episode of major depressive disorder, without psychotic features (H) [F32.2]    Approved and electronically signed by:  Jhon Alvarez MD             Admission Information     Date & Time Provider Department Dept. Phone    3/31/2018 Steve Khan MD [facility] [phone]      Your Vitals Were     Blood Pressure Pulse Temperature Respirations Height Weight    111/55 67 98.7  F (37.1  C) (Oral) 16 1.651 m (5' 5\") 55.7 kg (122 lb 14.4 oz)    Pulse Oximetry BMI (Body Mass Index)                96% 20.45 kg/m2          MyChart Information     [employer] lets you send messages to your doctor, view your test results, renew your prescriptions, schedule appointments and more. To sign up, go to [email or web address] . Click on \"Log in\" on the left side of the screen, which will take you to the Welcome " "page. Then click on \"Sign up Now\" on the right side of the page.     You will be asked to enter the access code listed below, as well as some personal information. Please follow the directions to create your username and password.     Your access code is: [email or web address]  Expires: 7/10/2018  8:13 AM     Your access code will  in 90 days. If you need help or a new code, please call your [facility] clinic or [phone].        Care EveryWhere ID     This is your Care EveryWhere ID. This could be used by other organizations to access your [facility] medical records  [id number]        Equal Access to Services     [facility] : Nenita Wright, carrie morataya, lashanda segovia, umang campbell . So [facility] [phone].    ATENCIÓN: Si habla español, tiene a kramer disposición servicios gratuitos de asistencia lingüística. Llame al [phone].    We comply with applicable federal civil rights laws and [place] laws. We do not discriminate on the basis of race, color, national origin, age, disability, sex, sexual orientation, or gender identity.               Review of your medicines      START taking        Dose / Directions    * QUEtiapine 50 MG tablet   Commonly known as:  SEROquel   Used for:  Anxiety   Replaces:  SEROQUEL XR PO        Dose:  50 mg   Take 1 tablet (50 mg) by mouth 3 times daily as needed (anxiety)   Quantity:  30 tablet   Refills:  0       * QUEtiapine 300 MG 24 hr tablet   Commonly known as:  SEROquel XR   Used for:  Severe single current episode of major depressive disorder, without psychotic features (H)        Dose:  300 mg   Take 1 tablet (300 mg) by mouth At Bedtime   Quantity:  30 tablet   Refills:  0       sodium chloride 0.65 % nasal spray   Commonly known as:  OCEAN   Used for:  Dry skin        Dose:  1-2 spray   Spray 1-2 sprays in nostril every hour as needed for other (dry nasal passages)   Quantity:  30 mL   Refills:  0       * Notice:  " This list has 2 medication(s) that are the same as other medications prescribed for you. Read the directions carefully, and ask your doctor or other care provider to review them with you.      CONTINUE these medicines which may have CHANGED, or have new prescriptions. If we are uncertain of the size of tablets/capsules you have at home, strength may be listed as something that might have changed.        Dose / Directions    * mirtazapine 45 MG tablet   Commonly known as:  REMERON   This may have changed:  medication strength   Used for:  Severe single current episode of major depressive disorder, without psychotic features (H)        Dose:  45 mg   Take 1 tablet (45 mg) by mouth At Bedtime   Quantity:  30 tablet   Refills:  0       * mirtazapine 45 MG tablet   Commonly known as:  REMERON   This may have changed:  You were already taking a medication with the same name, and this prescription was added. Make sure you understand how and when to take each.   Used for:  Severe single current episode of major depressive disorder, without psychotic features (H)        Dose:  45 mg   Take 1 tablet (45 mg) by mouth At Bedtime   Quantity:  30 tablet   Refills:  0       varenicline 1 MG tablet   Commonly known as:  CHANTIX   This may have changed:  medication strength   Used for:  Tobacco abuse        Dose:  1 mg   Take 1 tablet (1 mg) by mouth 2 times daily   Quantity:  60 tablet   Refills:  0       * Notice:  This list has 2 medication(s) that are the same as other medications prescribed for you. Read the directions carefully, and ask your doctor or other care provider to review them with you.      STOP taking     SEROQUEL XR PO   Replaced by:  QUEtiapine 50 MG tablet                Where to get your medicines      These medications were sent to [facility]/pharmacy #[id number] [place], [place] - [address] [facility]  [address] [place] [address]     Phone:  [phone]     mirtazapine 45 MG tablet         These  medications were sent to [facility] [place], [place] - [address]  [address] [place] [address]     Phone:  [phone]     mirtazapine 45 MG tablet    QUEtiapine 300 MG 24 hr tablet    QUEtiapine 50 MG tablet    varenicline 1 MG tablet         Some of these will need a paper prescription and others can be bought over the counter. Ask your nurse if you have questions.     You don't need a prescription for these medications     sodium chloride 0.65 % nasal spray                Protect others around you: Learn how to safely use, store and throw away your medicines at www.disposemymeds.org.             Medication List: This is a list of all your medications and when to take them. Check marks below indicate your daily home schedule. Keep this list as a reference.      Medications           Morning Afternoon Evening Bedtime As Needed    * mirtazapine 45 MG tablet   Commonly known as:  REMERON   Take 1 tablet (45 mg) by mouth At Bedtime   Last time this was given:  45 mg on 4/10/2018 10:40 PM                                   * mirtazapine 45 MG tablet   Commonly known as:  REMERON   Take 1 tablet (45 mg) by mouth At Bedtime   Last time this was given:  45 mg on 4/10/2018 10:40 PM                                   * QUEtiapine 50 MG tablet   Commonly known as:  SEROquel   Take 1 tablet (50 mg) by mouth 3 times daily as needed (anxiety)   Last time this was given:  50 mg on 4/9/2018  2:53 PM                                   * QUEtiapine 300 MG 24 hr tablet   Commonly known as:  SEROquel XR   Take 1 tablet (300 mg) by mouth At Bedtime   Last time this was given:  300 mg on 4/10/2018 10:40 PM                                   sodium chloride 0.65 % nasal spray   Commonly known as:  OCEAN   Spray 1-2 sprays in nostril every hour as needed for other (dry nasal passages)   Last time this was given:  2 sprays on 4/11/2018  9:50 AM                                varenicline 1 MG tablet    Commonly known as:  CHANTIX   Take 1 tablet (1 mg) by mouth 2 times daily   Last time this was given:  1 mg on 4/11/2018  7:43 AM                                      * Notice:  This list has 4 medication(s) that are the same as other medications prescribed for you. Read the directions carefully, and ask your doctor or other care provider to review them with you.

## 2018-03-31 NOTE — ED NOTES
Bed: ED17  Expected date:   Expected time:   Means of arrival:   Comments:  411  52 M psych eval  1724

## 2018-03-31 NOTE — ED PROVIDER NOTES
"  History     Chief Complaint:  Suicidal (Pt used cocaine last wk and marijuana this morning, puts knife to neck because \"everyone is out to get me\". Cousin convinces pt to stop. +stressors in life. Hx of suicide attempts/drug abuse. )       HPI   Raheem Jimenez is a 52 year old male with a medical history including depression and suicidal ideation who presents with suicidal ideation. The patient reports this morning he smoked marijuana and soon afterwards felt paranoid making him feel like everyone was out to get him. He put a razor to his neck and called his cousin for help who was able to talk him down and then preceded to call police. Patient admits to using cocaine a week ago, denies alcohol or other drug use besides marijuana today. He stopped taking is Seroquel 2 weeks ago because it made him feel sleepy. Patient reports recent stressors including going through a divorce. He voices no other areas of concern.      Allergies:  No Known Drug Allergies     Medications:    Percocet   Mitazapine  Duloextine  Seroquel, self discontinued 2 weeks ago    Past Medical History:    Past Medical History:   Diagnosis Date     Depressive disorder      Kidney stones        Past Surgical History:    Past Surgical History:   Procedure Laterality Date     KNEE SURGERY       ORTHOPEDIC SURGERY      Right rotator cuff repair and bicep tendon repain and collarbone shaved down 9/15/2011     SHOULDER SURGERY          Family History:    family history is not on file.     Social History:   reports that he quit smoking about 7 years ago. He does not have any smokeless tobacco history on file. He reports that he uses illicit drugs, including Marijuana and Cocaine. He reports that he does not drink alcohol.    PCP: Dru Rascon     Review of Systems   Constitutional: Negative for chills.   Respiratory: Negative for cough and shortness of breath.    Cardiovascular: Negative for chest pain and palpitations.   Gastrointestinal: " "Negative for abdominal pain, nausea and vomiting.   Neurological: Negative for headaches.   Psychiatric/Behavioral: Positive for dysphoric mood and suicidal ideas. Negative for hallucinations.   All other systems reviewed and are negative.    Physical Exam     Patient Vitals for the past 24 hrs:   BP Temp Temp src Heart Rate Resp SpO2 Height Weight   03/31/18 1734 136/79 98.8  F (37.1  C) Oral 94 18 97 % 1.651 m (5' 5\") 52.2 kg (115 lb)        General: Alert, interactive in no distress.   Head:  Scalp is atraumatic.  Eyes:  EOM intact. The pupils are equal, round, and reactive to light. No scleral icterus.  ENT:                                      Ears:  The external ears are normal.  Nose:  The external nose is normal.  Throat:  The oropharynx is normal. Mucus membranes are moist.                 Neck:  Normal range of motion. There is no rigidity. Trachea midline.  CV:  Regular rate and rhythm. No murmur. 2+ radial pulses.   Resp:  Breath sounds are clear bilaterally. Non-labored, no retractions or accessory muscle use.  GI:  Abdomen is soft, no distension, no tenderness.   MS:  Normal strength in all 4 extremities.   Skin:  Warm and dry, No rash or lesions noted.  Neuro:  Strength and sensation grossly intact. Gait stable. CN grossly intact.   Psych:  Awake. Alert and oriented x3. Depressed mood.   Lymph: No anterior or posterior cervical lymphadenopathy noted.        Emergency Department Course     Laboratory:  Labs Ordered and Resulted from Time of ED Arrival Up to the Time of Departure from the ED   DRUG ABUSE SCREEN 77 URINE (FL, RH, SH) - Abnormal; Notable for the following:        Result Value    Cannabinoids Qual Urine Positive (*)     All other components within normal limits        Emergency Department Course:  Past medical records, nursing notes, and vitals reviewed.  6:59 PM: I performed an exam of the patient and obtained history, as documented above.  The patient provided a urine sample here in the " emergency department. This was sent for laboratory testing, findings above.    1806: Discussed the patient with Morgan, from [facility], who evaluated the patient at bedside and agrees the patient would benefit from admission.  He discussed patient with patient's psychologist, Dr. Khan, who will accept care of the patient.  My supervising provider, Dr. Lawrence, also interviewed and examined the patient at bedside.       Findings and plan explained to the patient who consents to admission.    Morgan discussed the patient with Dr. Khan, who will admit the patient to a behavioral health bed for further monitoring, evaluation, and treatment.        Impression & Plan      Medical Decision Making:  Raheem Jimenez is a 52 year old male with medical history including suicide attempt, substance abuse, and depression presents with suicidal ideation.  Patient states that if he leaves the department he is a danger to himself and presents voluntarily.  Urine drug screen positive for cannabinoids.  He has no physical complaints and physical exam is unremarkable.  Patient was previously on Seroquel though discontinued it 2 weeks ago because he made him feel sleepy. Admits to relapsing and using cocaine 2 weeks ago. I discussed the patient with Morgan, from [facility], who agrees the patient is actively suicidal and would benefit from admission.  I discussed the patient with patient's psychiatrist, Dr. Khan, who graciously accepted care of the patient.  He will be admitted voluntarily to a mental health bed.     Diagnosis:    ICD-10-CM    1. Suicidal behavior R46.89         Disposition:   Admit        3/31/2018   Julia Tena PA-C  Supervising provider, Vita Lawrence MD Luell, Amy Jolene, PA-C  03/31/18 1911

## 2018-03-31 NOTE — IP AVS SNAPSHOT
[place] [address] [address] [place] [address]    Phone:  [phone]                                       After Visit Summary   3/31/2018    Raheem Jimenez    MRN: [id number]           After Visit Summary Signature Page     I have received my discharge instructions, and my questions have been answered. I have discussed any challenges I see with this plan with the nurse or doctor.    ..........................................................................................................................................  Patient/Patient Representative Signature      ..........................................................................................................................................  Patient Representative Print Name and Relationship to Patient    ..................................................               ................................................  Date                                            Time    ..........................................................................................................................................  Reviewed by Signature/Title    ...................................................              ..............................................  Date                                                            Time

## 2018-04-01 NOTE — PROGRESS NOTES
03/31/18 2027   Patient Belongings   Did you bring any home meds/supplements to the hospital?  No   Patient Belongings cell phone/electronics;other (see comments)   Disposition of Belongings Locker   Belongings Search Yes   Clothing Search Yes   Second Staff Gena   General Info Comment Belingings placed in locker      Hearing Aid   Glasses  Cell phone   Fit bit   Shoes with laces   Pair of socks   Sweatshirt with string  Button down shirt   Rehan shirt   Belt clip  Belt  Pair of jeans  x6 keys   Loose change  [place] Drivers license   x2 lighters  Pack of cigarettes   Empty pack of cigarettes   Winter hat   High school diploma card   $1.00 bill  Leather gloves   Tube of fixodent  Nasal spray   chap stick  Loose papers   Leather wallet     Security envelope #[id number]  [employer] Card   Visa #[id number]  Visa #[id number]  Mastercard #[id number]  [employer]   Social security card   A               Admission:  I am responsible for any personal items that are not sent to the safe or pharmacy.  [facility] is not responsible for loss, theft or damage of any property in my possession.    Signature:  _________________________________ Date: _______  Time: _____                                              Staff Signature:  ____________________________ Date: ________  Time: _____      2nd Staff person, if patient is unable/unwilling to sign:    Signature: ________________________________ Date: ________  Time: _____     Discharge:  [facility] has returned all of my personal belongings:    Signature: _________________________________ Date: ________  Time: _____                                          Staff Signature:  ____________________________ Date: ________  Time: _____

## 2018-04-01 NOTE — PHARMACY-ADMISSION MEDICATION HISTORY
Admission medication history interview status for the 3/31/2018  admission is complete. See EPIC admission navigator for prior to admission medications     Medication history source reliability:Good    Actions taken by pharmacist (provider contacted, etc):Verified all Rx medications with patient's retail pharmacy - [facility]     Additional medication history information not noted on PTA med list :None    Medication reconciliation/reorder completed by provider prior to medication history? No    Time spent in this activity: 15 minutes    Prior to Admission medications    Medication Sig Last Dose Taking? Auth Provider   MIRTAZAPINE PO Take 45 mg by mouth At Bedtime 3/30/2018 at hs Yes Unknown, Entered By History   QUEtiapine Fumarate (SEROQUEL XR PO) Take 300 mg by mouth At Bedtime ~4 weeks ago Yes Unknown, Entered By History   Varenicline Tartrate (CHANTIX PO) Take 1 mg by mouth 2 times daily 3/31/2018 at Unknown time Yes Unknown, Entered By History
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
## 2018-04-01 NOTE — PLAN OF CARE
"Problem: Depressive Symptoms  Goal: Depressive Symptoms  Signs and symptoms of listed problems will be absent or manageable.   Outcome: No Change  Blunt/flat, appears sad and depressed. Still endorses SI but no plan, contracts for safety. Visible out in ITC lounge and hallway, quiet, minimal interaction with peers but polite and respectful when interacting with staff. During 1:1, pt recounted his struggles with depression and drug use, stated he is \"feeling overwhelmed with life right now, and has felt like he would be better off dead\". Wants help. Reports neck and back pain from old fall injury. Offered to get order for Ensure or Boost, as patient is rather thin, but he declined and stated that he had an eating disorder a long time ago but his issue now is just \"eating poorly\".      "

## 2018-04-01 NOTE — PROGRESS NOTES
[facility]  Hospitalist Psychiatry Consultation         Annia Yang PA-C    Raheem Jimenez MRN# [id number]   YOB: 1965 Age: 52 year old      Date of Admission:  3/31/2018  Date of Consult: 4/1/2018         Assessment and Plan:   Raheem Jimenez is a 52 year old male who presented to the Emergency Room with suicidal thoughts/ideation and recent drug abuse (cocaine and marijuana).  He was admitted to mental health for assessment and treatment.     #Psychiatry Issues   #Drug Abuse  -admitted to the mental health unit  -medications and management per psychiatry team    #Medical Issues  -none  -labs and vitals WNL  -will sign off  -call with questions.     Annia Yang PA-C ([place])   Hospitalist  Pager: [phone]  8:22 AM  April 1, 2018            Code Status:   Full Code         Primary Care Physician:   Julia Burgess [phone]         Requesting Physician:   Dr Khan et al        Reason for Consult:   Medical Evaluation          History of Present Illness:   Raheem Jimenez is a healthy 52 year old male who presented to the ER with suicidal ideation and drug abuse.  He was admitted to the psychiatric unit for further cares.     I visited with the patient in the psych unit.  At this time she has no medical complaints; no breathing changes, SOB, CP, N/V/D, or dysuria.  Patient denies any rencent illnesses, fevers or excessive sweating/chills.             Past Medical History:     Past Medical History:   Diagnosis Date     Depressive disorder      Kidney stones                Past Surgical History:     Past Surgical History:   Procedure Laterality Date     KNEE SURGERY       ORTHOPEDIC SURGERY      Right rotator cuff repair and bicep tendon repain and collarbone shaved down 9/15/2011     SHOULDER SURGERY              Home Medications:     Prior to Admission medications    Medication Sig Last Dose Taking? Auth Provider   MIRTAZAPINE PO Take 45  "mg by mouth At Bedtime 3/30/2018 at hs Yes Unknown, Entered By History   QUEtiapine Fumarate (SEROQUEL XR PO) Take 300 mg by mouth At Bedtime ~4 weeks ago Yes Unknown, Entered By History   Varenicline Tartrate (CHANTIX PO) Take 1 mg by mouth 2 times daily 3/31/2018 at Unknown time Yes Unknown, Entered By History            Current Medications:           varenicline (CHANTIX) tablet 1 mg  1 mg Oral BID     QUEtiapine (SEROquel XR) 24 hr tablet 300 mg  300 mg Oral At Bedtime     mirtazapine (REMERON) tablet 45 mg  45 mg Oral At Bedtime     hydrOXYzine, QUEtiapine         Allergies:   No Known Allergies         Social History:   Raheem Jimenez  reports that he quit smoking about 7 years ago. He does not have any smokeless tobacco history on file. He reports that he uses illicit drugs, including Marijuana and Cocaine. He reports that he does not drink alcohol.            Family History:   No family history on file.            Review of Systems:   The 10 point Review of Systems is negative other than noted in the HPI.         Physical Exam:   Blood pressure (!) 133/96, temperature 99  F (37.2  C), temperature source Oral, resp. rate 16, height 1.651 m (5' 5\"), weight 52.4 kg (115 lb 9.6 oz), SpO2 100 %.  115 lbs 9.6 oz    GENERAL:  This is a well-nourished individual who is resting comfortably in bed.   NECK:  Supple without any lymphadenopathy or thyromegaly.   CARDIOVASCULAR:  Regular rate and rhythm, no murmurs, rubs or gallops.   LUNGS:  Clear to auscultation.  No wheezing, rhonchi or rubs.   ABDOMEN:  Positive bowel sounds, soft, nontender, nondistended.  No masses, no hepatosplenomegaly.   EXTREMITIES:  No edema, cyanosis or clubbing.  NEUROLOGIC:  Non focal  PSYCH:  Appropriate and normal affect.  Cooperative throughout the exam.          Data:   All new lab and imaging data was reviewed.   Recent Labs   Lab Test  03/31/18   2050  08/19/13   0800   10/16/11   1800   12/01/10   2150   WBC  8.5  14.6*   < >  " 17.2*   < >   --    HGB  15.4  16.4   < >  15.4   < >   --    MCV  90  88   < >  87   < >   --    PLT  290  340   < >  358   < >   --    INR   --    --    --   1.02   --   1.02    < > = values in this interval not displayed.      Recent Labs   Lab Test  03/31/18   2050  08/19/13   0800   NA  139  146*   POTASSIUM  4.9  4.0   CHLORIDE  102  106   CO2  31  21   BUN  15  22   CR  1.00  1.40*   ANIONGAP  6  20*   STEPHANI  9.5  10.6*   GLC  89  152*     Recent Labs   Lab Test  10/16/11   1800   TROPI  <0.012
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
## 2018-04-02 NOTE — PLAN OF CARE
Problem: General Plan of Care (Inpatient Behavioral)  Goal: Individualization/Patient Specific Goal (IP Behavioral)  The patient and/or their representative will achieve their patient-specific goals related to the plan of care.    The patient-specific goals include:   Decrease depression,   Decrease anxiety,   Decrease suicidal ideation,   Increase coping skills.   Outcome: No Change  Patient reports having overwhelming thought d/t upcoming criminal court appointment. He says that I feel like I want to use more drug to drawn my emotion. And then, he says the main time I want to be clean b/c this behavior is wrecking my life. If I continue this way I can lose my job at [employer]. He says getting clean with CD tx might help. This shift, he was c/o HA and neck discomfort, and after a PRN tylenol, he verbalized decreased in pain. It is also noted that patient's judgement impaired; while he was in the step down lounge, a staff member observed that patient was position himself btwn two chair, which possibly would have put him at risk for falling. Contract for safety. Please continue to monitor.

## 2018-04-02 NOTE — PROGRESS NOTES
"Patient denies suicidal ideation or hallucinations. He contracts for safety. Patient stated that he was hiding something that he wanted to share.  He states that he has been struggling with transgender.  He has been dressing up as a girl since the 3rd grade.  He continues this but only at home.  States that he is afraid to go out dressed as a women.  His wife is aware and has been aware since before they were .  He states his wife encouraged it.  He then pulled up his shirt to show the writer his belly button. He had a ring in it.  He stated that he got it about a month ago and \" normal charisma would do this\".  He is attending groups and participating well and is on board with the plan of care.   "

## 2018-04-02 NOTE — PROGRESS NOTES
"BEHAVIORAL HEALTH NUTRITION ASSESSMENT      REASON FOR ASSESSMENT:  RN consult - pt has been eating poorly for a significant amount of time and looks very thin.  Pt admitted for SI.   CURRENT DIET AND NOURISHMENT ORDER:    Information obtained from the chart.    Diet: Regular    Current Intake/Tolerance:  No intake recorded on the doc flowsheet. Patient not available/not appropriate to visit with at this time. Patient ordering adequate amounts of food and balanced meals as per review of menus.  Per nsg note, offered to order supplements but pt declined, stated he had hx of eating disorder but his issue now is just poor intake.      ANTHROPOMETRICS:  Height: 5' 5\"  Weight: 52.4 kg  BMI: 19.24 kg/m2  IBW: 61.8 kg  %IBW: 855  Weight History:   Wt Readings from Last 10 Encounters:   03/31/18 52.4 kg (115 lb 9.6 oz)   08/19/13 56.7 kg (125 lb)   10/25/12 49.9 kg (110 lb)   03/19/12 52.2 kg (115 lb)   01/22/12 63.5 kg (140 lb)   10/16/11 61.2 kg (135 lb)       LABS:  Reviewed    NUTRITION STATUS VALIDATION:  Weight status: Normal BMI    INTERVENTION:    Nutrition Diagnosis:  No nutrition diagnosis at this time.    Implementation:   Nutrition education: Per MD order if appropriate     Follow Up/Monitoring:   No need for further follow-up unless another consult received.    Rhiannon Edward RD  Pager [phone] (M-F)            [phone] (W/E & Hol)        "

## 2018-04-02 NOTE — PLAN OF CARE
Problem: General Plan of Care (Inpatient Behavioral)  Goal: Team Discussion  Team Plan:    Outcome: No Change  BEHAVIORAL TEAM DISCUSSION    Participants: Dr. Khan, Case Management, Nursing Staff, PA's   Progress: Improving  Continued Stay Criteria/Rationale: Continue stay of hospitalization  Medical/Physical:   Precautions:   Behavioral Orders   Procedures     Code 1 - Restrict to Unit     Routine Programming     As clinically indicated     Status 15     Every 15 minutes.     Plan: D/C to [facility] eventually  Rationale for change in precautions or plan: Continue stay      Problem: Patient Care Overview  Goal: Team Discussion  Team Plan:    Outcome: No Change  BEHAVIORAL TEAM DISCUSSION    Participants: Dr. Khan, Case Management, Nursing Staff, PA's   Progress: Improving  Continued Stay Criteria/Rationale: Continue stay of hospitalization  Medical/Physical:   Precautions:   Behavioral Orders   Procedures     Code 1 - Restrict to Unit     Routine Programming     As clinically indicated     Status 15     Every 15 minutes.     Plan: D/C to [facility] eventually  Rationale for change in precautions or plan: Continue stay
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
## 2018-04-03 NOTE — PLAN OF CARE
Problem: Depressive Symptoms  Goal: Depressive Symptoms  Signs and symptoms of listed problems will be absent or manageable.   Outcome: No Change  Pt presents with a flat affect and depressed mood. Pt is visible on the unit, seen socializing with peers and staff. Pt wears his potter up most of the time and complains that he is cold. Pt attended unit programing and was appropriate. While discussing the topic of suicide the pt expressed to the group that he had thoughts of cutting his neck in his employers parking lot. Pt states that he couldn't go through with it because he didn't want to leave his family behind. Pt spent time in the lounge watching a movie with peers. Pt then retreated to his room and remains asleep for the duration of the shift.

## 2018-04-03 NOTE — PLAN OF CARE
Problem: Depressive Symptoms  Goal: Depressive Symptoms  Signs and symptoms of listed problems will be absent or manageable.   Patient has some anxiety about circumstances at home , but is visible on the unit and attending groups and participating well. Pt states his wife is supportive and encouraging him to follow his Transgender feelings. Pt denies SI currently.
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
## 2018-04-04 NOTE — PLAN OF CARE
Problem: Depressive Symptoms  Goal: Depressive Symptoms  Signs and symptoms of listed problems will be absent or manageable.   Outcome: No Change  Pt has been present and pleasant in the SDU throughout the day shift. Ate most of his meals for breakfast and lunch and has been attending groups and participating accordingly. Pt is respectful to peers and staff and presents a flat, depressed affect; remains calm.

## 2018-04-04 NOTE — PLAN OF CARE
Problem: Depressive Symptoms  Goal: Depressive Symptoms  Signs and symptoms of listed problems will be absent or manageable.   Outcome: Improving  Tense. Flat/blunt affect. Social with others. Attended groups and participated. Talked joyfully about his job at [employer]. Worried about the legal trouble he is in. Expressed strong desire to do CD treatment. Denies SI. Med compliant.
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
## 2018-04-05 NOTE — PLAN OF CARE
"Problem: Depressive Symptoms  Goal: Depressive Symptoms  Signs and symptoms of listed problems will be absent or manageable.   Outcome: Improving  Quiet, visible, minimally social, respectful  and emotionally distant. Had a depressed flat affect. Denies SI. Stated. \" I feeling better than when I was admitted and just waiting to go to [facility] and I am okay with going to treatment. \". Spent most of time in lounge watching TV with others .       "
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
## 2018-04-05 NOTE — PLAN OF CARE
Problem: Depressive Symptoms  Goal: Depressive Symptoms  Signs and symptoms of listed problems will be absent or manageable.   Outcome: No Change  Pt has been present and pleasant in the SDU throughout the day shift. Attended groups and participated accordingly. Presents a flat, sad and depressed affect but remains calm and collected. Respectful to peers and staff. Ate most of his meals for breakfast and lunch. Pt had an exchange with one of the other peers regarding the shades in the lounge because it was hurting his eyes and the other pt started chest bumping him a couple times. Pt was irritable after this, but was able to collect himself. Pt has been in his room more often than not during the latter half of the day shift.

## 2018-04-05 NOTE — PLAN OF CARE
Problem: General Plan of Care (Inpatient Behavioral)  Goal: Team Discussion  Team Plan:    Outcome: No Change  BEHAVIORAL TEAM DISCUSSION    Participants: Dr. Khan, Case Management, Nursing staff, Psych associates   Progress: Continues to feel anxious, encouraged to use coping skills. Awaiting CD assessment for [facility].  Continued Stay Criteria/Rationale: Awaiting CD assessment for [facility].   Medical/Physical:   Precautions:   Behavioral Orders   Procedures     Code 1 - Restrict to Unit     Routine Programming     As clinically indicated     Status 15     Every 15 minutes.     Plan: Awaiting CD assessment for [facility].   Rationale for change in precautions or plan: Awaiting CD assessment for [facility].      Problem: Patient Care Overview  Goal: Team Discussion  Team Plan:    Outcome: No Change  BEHAVIORAL TEAM DISCUSSION    Participants: Dr. Khan, Case Management, Nursing staff, Psych associates   Progress: Continues to feel anxious, encouraged to use coping skills. Awaiting CD assessment for [facility].  Continued Stay Criteria/Rationale: Awaiting CD assessment for [facility].   Medical/Physical:   Precautions:   Behavioral Orders   Procedures     Code 1 - Restrict to Unit     Routine Programming     As clinically indicated     Status 15     Every 15 minutes.     Plan: Awaiting CD assessment for [facility].   Rationale for change in precautions or plan: Awaiting CD assessment for [facility].
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
## 2018-04-06 NOTE — PLAN OF CARE
Problem: Depressive Symptoms  Goal: Depressive Symptoms  Signs and symptoms of listed problems will be absent or manageable.   Outcome: No Change  Pt was active and cooperative within the SDU. Pt presents with a flat blunt sad affect and depressed anxious mood. Pt spent almost all day in the lounge watching tv and stretching. Pt is minimally social, and sad upon approach. Pt attended all groups in the lounge and participated appropriately. Pt stated that he's just waiting for his CD treatment so he can get discharged. Pt ate all of his food and was med compliant.

## 2018-04-06 NOTE — CONSULTS
The writer met with the patient introduced herself and her role. The patient completed an assessment. Recommendation would be inpatient/residential treatment at [facility]. The patient agreed to this. The writer will finish up the assessment and send the referral (DOMENICA, assessment) to [facility]. The writer gave the patient her business card for any questions.

## 2018-04-06 NOTE — PROGRESS NOTES
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 such as having sex with many partners, masturbating often, using pornography often? No    19. Have you ever purged, binged or restricted yourself as a way to control your weight? No    20. Have you ever believed people were spying on you, or that someone was plotting against you or trying to hurt you? No    21. Have you ever believed someone was reading your mind or could hear your thoughts or that you could actually read someone's mind or hear what another person was thinking? No    22. Have you ever believed that someone or some force outside of yourself was putting thoughts into your mind or made you act in a way that was not your usual self?  Have you ever thought you were possessed? No    23. Have you ever believed you were being sent special messages through the TV, radio, newspaper or internet?  No    24. Have you ever heard things other people couldn't hear, such as voices or other noises? No    25. Have you ever had visions when you were awake?  Or have you ever seen things other people couldn't see? No    26. Have you ever had to lie to people important to you about how much you seay? No    27. Have  you ever felt the need to bet more and more money? No    28. Have you ever attempted treatment for a gambling problem? No    29. Highest grade of school completed:  Associate degree/vocational certificate    30. Do you have any difficulties with reading, writing or calculating?  No    31. Have you ever been diagnosed with a learning disability, such as ADHD or dyslexia?  No    32. What is your preferred learning style?  by hands-on practice    33. Do you have any problems with memory impairment or problem solving?  No    34. Do you have any problems with headaches or dizziness? No    35. Have you ever been in the ?  No    36. Have you been diagnosed with traumatic brain injury or Alzheimer s?  No    37. Have you ever hit your head or been hit on the head?  No    38. Have you ever had medical treatment for an injury to your head?  No    39. Have you had any significant illness that affected your brain (brain tumor, meningitis, West Nile Virus, stroke, seizure, heart attack, near drowning or near suffocation)?  No    40. Have you ever been diagnosed with Fetal Alcohol Effects or Fetal Alcohol Syndrome?  No    41. What are your some of your personal strengths?  per pt: Not using, very responsible, people have respect for knowledged and willing to help people.     Dimension III Ratings Summary   Emotional/Behavioral/Cognitive - The placing authority must use the criteria in Dimension III to determine a client s emotional, behavioral, and cognitive conditions and complications.   RISK DESCRIPTIONS - Severity ratin Client has difficulty with impulse control and lacks coping skills. Client has thoughts of suicide or harm to others without means; however, the thoughts may interfere with participation in some treatment activities. Client has difficulty functioning in significant life areas. Client has moderate symptoms of emotional, behavioral, or cognitive problems. Client is able to participate in most  treatment activities.    REASONS SEVERITY WAS ASSIGNED - What current issues might with thinking, feelings or behavior pose barriers to participation in a treatment program? What coping skills or other assets does the person have to offset those issues? Are these problems that can be initially accommodated by a treatment provider? If not, what specialized skills or attributes must a provider have?    Pt reported and EMR collateral confirmed pt has mental health diagnosis. Pt reported he was not taking his medications as prescribed and directed prior to admission for mental health symptoms. Pt reported past therapy but denied current. Pt reported past trauma/abuse issues. Pt reported multiple environmental stressors. Pt was admitted to [facility] due to suicide attempt and ideation. Pt reported past suicide attempts. Pt denied SI, intent, plans, or means at the time of the assessment.         DIMENSION IV - Readiness to Change     1. What is your motivation for participating in this evaluation today?    Per pt: I want to get my life back because I have done it before, I just stumbled and feel and cannot quit on my own.     2. What problematic behaviors have you engaged in when using alcohol or other drugs that could be hazardous to you or others (i.e. driving a car/motorcycle/boat, operating machinery, unsafe sex, IV drug use, sharing needles, etc.)  (DSM-5: Sx #8)    Per pt: driving and working     3. If applicable, when did you first think you had a problem with your alcohol or other drug use?    Per pt: couple weeks ago.     4. Who in your life has shared concerns with you about your use of alcohol or other drugs?    Per pt: my wife probably my family too, even though they knew, I was isolating. Went back in my shell and stayed in my own little world.     5. Are there any changes you have made or plan to make regarding how you had been using alcohol or other drugs?    Yes, explain: per pt: try to find the treatment  center to get my back in line and to deal with pain.     Dimension IV Ratings Summary   Readiness for Change - The placing authority must use the criteria in Dimension IV to determine a client s readiness for change.   RISK DESCRIPTIONS - Severity ratin Client is motivated with active reinforcement, to explore treatment and strategies for change, but ambivalent about illness or need for change.    REASONS SEVERITY WAS ASSIGNED - (What information did the person provide that supports your assessment of his or her readiness to change? How aware is the person of problems caused by continued use? How willing is she or he to make changes? What does the person feel would be helpful? What has the person been able to do without help?)      Pt reported his motivation for the assessment was to get his life back on track. Pt reported he knew his use was a problem a couple of weeks ago. Pt reported his wife and family expressed concern about his use. Pt reported changes he is willing to make is treatment to get his life back on track and to positively cope with pain. Pt appears to be in the contemplative stage of change evidenced by his verbal report.        DIMENSION V - Relapse, Continued Use and Continued Problem Potential     1. If you have had previous periods of sobriety, when was your longest period of sobriety and what were you doing at that time that was supporting your sobriety?  (DSM-5: Sx #2)    Per pt: Got back into using because of pain and ran out of pain pills, two neck surgeries almost in a year and half of each other. Have been sober from alcohol since , prior was sober from marijuana from 5 years ago. Using but not heavily until the last three years. Living with parents and did not want to let them down, then moved out after got , then started using a little bit here and there. Been using it periodically but not very often, co-worker called me for a ride to go  cocaine and he offered me  two lines and I took it.     2. Within the past 30 days, on a scale from 0-10 (0 = having no cravings at all and 10 = having very strong cravings to use alcohol or other drugs) what number would you assign to your cravings? (DSM-5: Sx #4)     Per pt:  Marijuana  10/10  Cocaine   0/10    3. Can you identify any specific reasons or specific triggers that contribute to you being more likely to consume alcohol or other drugs? (DSM-5: Sx #4)    Yes, explain: per pt:pain and depression, cocaine was available. Beat myself up for using the more I use. Until I did not know I was coming or going, could not remember hardly anything or wife was telling me. Transgender and coming out and that was another trigger for using, came out in 1996 and forced back in closet and had to move back home, dad said we cannot have that shit here and hide feelings again. Wife was encouraging it all of sudden asked for a divorce out of the blue. Told me I need to work on that and be free of who I felt like I should be.     4. Have you been treated for alcohol/other substance use disorder? (DSM-5: Sx #2)    4B. Number of times(lifetime) (over what period) 4.   4C. Number of times completed treatment (lifetime) 3.   4D. During the past three years have you participated in outpatient and/or residential?  No    Per EMR hx: reported in past 6 treatments in lifetime.     5. Support group participation: Have you/do you attend 12-step or other support group meetings? How recently? What was your experience? Are you willing to restart? If the person has not participated, is he or she willing?  (DSM-5: Sx #2)    The patient denied having any recent attendance at 12-step or other support group meetings. Not in 8 years. Another trigger of using not going to AA Meetings.     6. Do you drink alcohol or use other drugs in larger amounts than intended or over a longer period of time than was intended?  (DSM-5: Sx #1)    Yes, explain: daily use of heavy amount of  marijuana. Cocaine use periodically. Opiates during prescription.     7. Do you spend a great deal of time engaged in activities necessary to obtain alcohol or other drugs, a great deal of time using alcohol or other drugs, or a great deal of time recovering from alcohol or other drug use?  (DSM-5: Sx #3)    Yes, explain: same as above. Throughout day heavy use of marijuana.     Dimension V Ratings Summary   Relapse/Continued Use/Continued problem potential - The placing authority must use the criteria in Dimension V to determine a client s relapse, continued use, and continued problem potential.   RISK DESCRIPTIONS - Severity ratin No awareness of the negative impact of mental health problems or substance abuse. No coping skills to arrest mental health or addiction illnesses, or prevent relapse.    REASONS SEVERITY WAS ASSIGNED - (What information did the person provide that indicates his or her understanding of relapse issues? What about the person s experience indicates how prone he or she is to relapse? What coping skills does the person have that decrease relapse potential?)      Pt reported periods of sobriety and what was helpful to abstain was living with his parents and not wanting to disappoint them. Pt reported what lead back to use was running out of pain prescription, pain, moving in with wife, and peer offered him drug. Pt reported cravings for marijuana. Pt reported four treatments in his lifetime in which he completed 3. Per EMR hx pt may have attended more treatment programs. Pt denied any recent attendance of sober support group meetings but reported history of attendance. Pt appears to be at high risk for continued use as he appears to lack daily sober living skills/relapse skills.        DIMENSION VI - Recovery Environment     1. Are you employed or attending school?    Per pt:Full time work, [employer], they put me on leave of absence. So I can continue to have insurance.  Not sure if they will  keep me after.     2. If working or a student, are you able to function appropriately in that setting?     Yes    3. Has your job and/or school work been negatively impacted by your use of alcohol of other drugs?  (DSM-5: Sx #5 & Sx #7)    No    4. How would you describe your current finances?  In serious debt     5. Are you having financial problems, such as money being tight, living paycheck to paycheck, having unpaid or late bills, having significant debt, a history of bankruptcy, or IRS problems?    Yes, please explain:per pt: lot of unpaid bills.      6. Describe a typical day; evening for you. Work, school, social, leisure activities, volunteer, exercise, spiritual practices or other daily tasks.    Per pt: getting up smoking weed, going to work, taking a break, smoking weed, coming home, smoking weed and then watching tv and dinner and then go downstairs smoke weed. Before bed smoked weed, when I got up.     7. Have you reduced or discontinued recreational activities, hobbies or other leisure activities as a result of your use of alcohol or other drugs?  (DSM-5: Sx #7)    Yes, If yes explain:per pt: stopped doing them, loved taking care of my yard, used to make glass blocks and stopped doing that.     8. Who do you live with?      Per pt:Wife-couple weeks kicked me out before that and staying at a hotel.     9. Are there any people in the home who have current substance abuse issues or have mental health issues?     No    10. Tell me about your living environment/neighborhood? Ask enough follow up questions to determine safety, criminal activity, availability of alcohol and drugs, supportive or antagonistic to the person making changes.      NA    11. Are you concerned for your safety or anyone else's safety in the home? No    12. Do you have plans to move somewhere else or change your living environment in any manner?    NA    13. Do you have children who live with you?     No    14. Do you have children who  do not live with you?     No    15. Do you have any history of being involved with Child Protection Services? No     16. Are you currently in a significant relationship?     No    17. How do you identify your sexual orientation?    Transgender    18. The patient reported: .    19. Does your significant other have a history of substance abuse or have current substance abuse issues?    No    20. How important is substance use to your social connections? Do many of your family or friends use?     Not important-peer at work uses.     21. Who in your life would you consider to be your primary support network at this time?    Per pt: my family.     22. Have any of your relationships (S.O., family members, friends, employers, teachers, etc.) been negatively impacted by your use of alcohol or other drugs?  (DSM-5: Sx #6)    Yes, If yes explain: per pt:wife, family.     23. Do you currently participate in community festus activities, such as attending Amish, temple, Faith or Hoahaoism services?  No    24. Criminal justice history: Gather current/recent history and any significant history related to substance use--Arrests? Convictions? Circumstances? Alcohol or drug involvement? Sentences? Still on probation or parole? Expectations of the court? Current court order?  (DSM-5: Sx #8)    Domestic, waiting for court, court was supposed to be on 4/13/18 but  is getting postponed, probation violation as I had a similar incident with my dad a year ago. Unsupervised probation is with [facility], new one is with [facility]. Stemmed from using. Angry person again and not that kind of person.     25. Are you or have you ever been a registered sex offender?  No    26. Do you have a child protection worker,  or ?  No    27. Are you currently on any type of commitment? No, I'm not on any type of commitment at this time.    28. Do you have a valid 's license? Yes    29. What  obstacles exist to participating in treatment? (Time off work, childcare, funding, transportation, pending long term time, living situation)     None    Dimension VI Ratings Summary   Recovery environment - The placing authority must use the criteria in Dimension VI to determine a client s recovery environment.   RISK DESCRIPTIONS - Severity ratin Client has (A) Chronically antagonistic significant other, living environment, family, peer group or long-term criminal justice involvement that is harmful to recovery or treatment progress, or (B) Client has an actively antagonistic significant other, family, work, or living environment with immediate threat to the client s safety and well-being.    REASONS SEVERITY WAS ASSIGNED - (What support does the person have for making changes? What structure/stability does the person have in his or her daily life that will increase the likelihood that changes can be sustained? What problems exist in the person s environment that will jeopardize getting/staying clean and sober?)     (B) Pt reported he was working full time. Pt reported he is on a leave of absence currently. Pt reported he was living in a hotel as he was kicked out of his home with his wife. Pt reported she recently asked him for a divorce. Pt reported she did not use any substances.Pt reported he was isolating. Pt reported his primary support is his family. Pt reported his family is not supportive of his sexual identity. Pt denied having children. Pt reported past and current legal issues. Pt reported his environment is not conducive to sobriety at this time and appears to lack a sober support network.        Mental Health Status   Physical Appearance/Attire: Appears younger than stated age and Neat   Hygiene: well groomed   Eye Contact: at examiner   Speech Rate:  regular   Speech Volume: regular   Speech Quality: fluid   Cognitive/Perceptual:  reality based   Cognition: memory intact    Judgment: able to  concentrate   Insight: able to concentrate   Orientation:  time, place, person and situation   Thought:   concrete   Hallucinations:  none   General Behavioral Tone: cooperative   Psychomotor Activity: no problem noted   Gait:  no problem   Mood: depressed   Affect: flat/none   Counselor Notes: NA     Patient Choices/Exceptions     Would you like services specific to language, age, gender, culture, Rastafarian preference, race, ethnicity, sexual orientation or disability?  No    What particular treatment choices and options would you like to have? IP    Do you have a preference for a particular treatment program?  [facility]    Patient is willing to follow treatment recommendations.  Yes    DSM-5 Criteria for Substance Use Disorder   Criteria for Diagnosis  Instructions: Determine whether the client currently meets the criteria for Substance Use Disorder using the diagnostic criteria in the DSM-5 pp.[id number]. Current means during the most recent 12 months outside a facility that controls access to substances.    A problematic pattern of alcohol/drug use leading to clinically significant impairment or distress, as manifested by at least two of the following, occurring within a 12-month period:    1. Alcohol/drug is often taken in larger amounts or over a longer period than was intended.  2. There is a persistent desire or unsuccessful efforts to cut down or control alcohol/drug use  3. A great deal of time is spent in activities necessary to obtain alcohol/drug, use alcohol/drug, or recover from its effects.  4. Craving, or a strong desire or urge to use alcohol/drug  6. Continued alcohol use despite having persistent or recurrent social or interpersonal problems caused or exacerbated by the effects of alcohol/drug.  8. Recurrent alcohol/drug use in situations in which it is physically hazardous.  9. Alcohol/drug use is continued despite knowledge of having a persistent or recurrent physical or psychological problem  that is likely to have been caused or exacerbated by alcohol.  10. Tolerance, as defined by either of the following: A need for markedly increased amounts of alcohol/drug to achieve intoxication or desired effect. and A markedly diminished effect with continued use of the same amount of alcohol/drug.  11. Withdrawal, as manifested by either of the following: The characteristic withdrawal syndrome for alcohol/drug (refer to Criteria A and B of the criteria set for alcohol/drug withdrawal).          Specify if: In early remission:  After full criteria for alcohol/drug use disorder were previously met, none of the criteria for alcohol/drug use disorder have been met for at least 3 months but for less than 12 months (with the exception that Criterion A4,  Craving or a strong desire or urge to use alcohol/drug  may be met).     In sustained remission:   After full criteria for alcohol use disorder were previously met, non of the criteria for alcohol/drug use disorder have been met at any time during a period of 12 months or longer (with the exception that Criterion A4,  Craving or strong desire or urge to use alcohol/drug  may be met).   Specify if:   This additional specifier is used if the individual is in an environment where access to alcohol is restricted.    Mild: Presence of 2-3 symptoms  Moderate: Presence of 4-5 symptoms  Severe: Presence of 6 or more symptoms    DSM-5 Substance Use Disorder Diagnosis     Cannabis Use Disorder Severe - 304.30 (F12.20)  Opioid Use Disorder Mild - 305.50 (F11.10) In early remission  Cocaine Use Disorder Moderate - 304.20 (F14.20)  Tobacco Use Disorder Mild - 305.10 (Z72.0)    Collateral Contact Summary     Number of contacts made:  1    Contact with referring person:  Yes, If yes explain: EMR    If court related records were reviewed, summarize here:  NA    Information from collateral contacts supported/largely agreed with information from the client and associated risk  ratings.    Collateral Contact      Name: Relationship: Phone number: Releases:   [facility] Record (EMR) Medical Records FITO PAYTON     Pt medical record chart was reviewed for collateral purposes of this assessment. See throughout above assessment collateral obtained from EMR.       Collateral Contact      Name: Relationship: Phone number: Releases:   FITO PAYTON     NA      Recommendations     1. Abstain from using all non-prescribed mood altering chemicals and substances. Take all medication as prescribed and directed by licensed physicians.  2. Attend and complete a co-occurring residential treatment program such as [facility] program.  3. Follow all recommendations and referrals made by medical team during admission and at discharge.       Level of Care   I.) Intoxication and Withdrawal: 0   II.) Biomedical:  1   III.) Emotional and Behavioral:  2   IV.) Readiness to Change:  1   V.) Relapse Potential: 4   VI.) Recovery Environmental: 4     Initial Problem List     The patient has unstable housing  The patient is currently living in an unhealthy and/or using environment  The patient lacks relapse prevention skills  The patient has poor coping skills  The patient has poor refusal skills   The patient lacks a sober peer support network  The patient has a tendency to isolate  The patient has dual issues of MI and CD  The patient lacks the ability to effectively manage his/her mental health issues  The patient has a significant history of trauma and/or abuse issues  The patient has a significant history of guilt and shame issues  The patient has current legal issues

## 2018-04-06 NOTE — PLAN OF CARE
Problem: Patient Care Overview  Goal: Discharge Needs Assessment  Patient attended Process Group on Thursday, 04/05/18 and participated appropriately. Patient demonstrated good insight into the topic of discussion.
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
## 2018-04-06 NOTE — PLAN OF CARE
Problem: Depressive Symptoms  Goal: Depressive Symptoms  Signs and symptoms of listed problems will be absent or manageable.   Pt presents with a blunt affect. Pt was mainly watching TV in the lounge. Pt feels he has improved and that he is ready to go to [facility], but a CD assessment needs to be done first. Another pt was complaining that he was hogging the remote controller, but staff did not observe him hogging it, and it did not escalate.

## 2018-04-07 NOTE — PLAN OF CARE
Problem: Depressive Symptoms  Goal: Depressive Symptoms  Signs and symptoms of listed problems will be absent or manageable.   Outcome: Improving  Patient in a good mood today.  He spent time talking on the phone and was laughing and enjoying the conversation.  He is [facility] that [facility] the next [facility].  He is happy with that.  Attending groups.

## 2018-04-07 NOTE — PLAN OF CARE
"Problem: Depressive Symptoms  Goal: Depressive Symptoms  Signs and symptoms of listed problems will be absent or manageable.   Outcome: No Change  Patient presents with a blunt affect and depressed mood. Pt is visible on the unit and pleasant with staff and peers. Pt completed the CD assesment and plans to [facility] when [facility] bed becomes available. Pt has little insight, pt approached staff and asked to be discharged. Pt stated \" I want to be discharged, I have things to do before I go to treatment.\" Staff discussed that he is still waiting [facility] a bed [facility] and once one becomes available he will be informed. Pt was agreeable and decided it was best if he stay until a bed becomes avilable. Pt attended unit programing and was appropriate.       "

## 2018-04-08 NOTE — PROGRESS NOTES
Patient pleasant with full range affect. Observed in the activity room participating and discussing with peers and laughing. spent time in the [employer] watching movie.  Aware that he will [facility] Wed of next week.  Attended all groups and OT with full participation.

## 2018-04-08 NOTE — PLAN OF CARE
Problem: Depressive Symptoms  Goal: Depressive Symptoms  Signs and symptoms of listed problems will be absent or manageable.   Outcome: Improving  Patient is looking forward to discharging.  Spending time in the lounge and in the hallway.  Attending groups.  Mood is calm.

## 2018-04-08 NOTE — PLAN OF CARE
Problem: General Plan of Care (Inpatient Behavioral)  Goal: Individualization/Patient Specific Goal (IP Behavioral)  The patient and/or their representative will achieve their patient-specific goals related to the plan of care.    The patient-specific goals include:   Decrease depression,   Decrease anxiety,   Decrease suicidal ideation,   Increase coping skills.   Outcome: Improving  Pt is visible through out SDU. Pleasant to staff and peers. Observed talking to peers/ joking and laughing. Watching TV in the lounge. Pt is [facility] to [facility] to continue inpatient treatment.

## 2018-04-09 NOTE — PLAN OF CARE
Problem: General Plan of Care (Inpatient Behavioral)  Goal: Team Discussion  Team Plan:    Outcome: Improving  BEHAVIORAL TEAM DISCUSSION    Participants: Dr. Alvarez, nurses, , psych asst.  Progress: Improving. Pt is feeling better. Pt is [facility], waiting for bed/confirmation. Pt d/c either tomorrow or Wednesday.  Continued Stay Criteria/Rationale: Continue monitoring pt, awaiting [facility]  Medical/Physical: N/A  Precautions:   Behavioral Orders   Procedures     Code 1 - Restrict to Unit     Routine Programming     As clinically indicated     Status 15     Every 15 minutes.     Plan: Continuing current treatment for pt, [facility] within the next couple of days.  Rationale for change in precautions or plan: [facility]waiting for [facility] to confirm a bed.      Problem: Patient Care Overview  Goal: Team Discussion  Team Plan:    Outcome: Improving  BEHAVIORAL TEAM DISCUSSION    Participants: Dr. Alvarez, nurses, , psych asst.  Progress: Improving. Pt is feeling better. Pt is [facility], waiting for bed/confirmation. Pt d/c either tomorrow or Wednesday.  Continued Stay Criteria/Rationale: Continue monitoring pt, awaiting [facility]  Medical/Physical: N/A  Precautions:   Behavioral Orders   Procedures     Code 1 - Restrict to Unit     Routine Programming     As clinically indicated     Status 15     Every 15 minutes.     Plan: Continuing current treatment for pt, [facility] within the next couple of days.  Rationale for change in precautions or plan: Awaiting for [facility] to confirm a bed.
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
## 2018-04-09 NOTE — PROGRESS NOTES
Faxed CD assessment to [facility], as he had called and said he could not locate it.    P[facility] called on 4/10- received the CD eval and has a bed available at 1 pm tomorrow. Called him back to ask if they can provide transportation. Received call back. They can transport at 12:30 tomorrow. Call placed to Dr. Alvarez to notify him of early discharge and need for medications to be ready.
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
## 2018-04-09 NOTE — PROGRESS NOTES
Call placed to [facility] asking for information on bed availability for patient. Message left for Pankaj Gutierrez, admissions,[phone], with request for return call.   Pankaj called back and said next bed availability he is expecting is Thursday. If bed available earlier he will call .

## 2018-04-09 NOTE — PLAN OF CARE
Problem: Depressive Symptoms  Goal: Depressive Symptoms  Signs and symptoms of listed problems will be absent or manageable.   Outcome: Improving  Pt reports feeling ready to move on.  He is looking forward to starting treatment and feels that it will be beneficial.  He feels that his meds are working and now that the drugs are out of his system he does not feel paranoid. Reports feeling irritable at times with peers.  Brightens during interaction.  Addressed the broken plastic knife found during room checks. Pt feels that it may have fell out of his pocket.He said he uses it as a stir stick and that it broke when he cut an apple.  Denied SI.
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
## 2018-04-10 NOTE — PLAN OF CARE
Problem: Depressive Symptoms  Goal: Depressive Symptoms  Signs and symptoms of listed problems will be absent or manageable.   Outcome: Improving  Pt has been bright and pleasant, attending all groups.  Pt spent downtime in the lounge watching TV.  Pt expresses excitement and anxiety at going to treatment on Thursday, stating he wants to stay healthy and sober.
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
## 2018-04-10 NOTE — DISCHARGE INSTRUCTIONS
Behavioral Discharge Planning and Instructions    Summary: Admitted to hospital with suicidal ideation after relapse.    Main Diagnosis: Major depression; polysubstance use disorder     Major Treatments, Procedures and Findings:psychiatric assessment; chemical health assessment    Symptoms to Report: mood getting worse or thoughts of suicide    Lifestyle Adjustment: Sober lifestyle. Develop and follow safety plan. Follow up with co-occurring treatment program and follow their recommendations for aftercare.      Psychiatry Follow-up:     [facility]- entry date 4/11/18.  [place]   [phone] fax:[phone]    Dr. Khan   - will follow you in program and you can set up appointments with him as you prepare to discharge.     [facility]  [address] [facility], [address]  [place]  [address]  Phone:  [phone]  Fax:  [phone]    Resources:   Crisis Intervention: 209.626.5639 or 618-140-4102 (TTY: 496.946.8704).  Call anytime for help.  National [place] on Mental Illness (www.[place].kita.org): 325.638.3981 or 011-810-0781.  Alcoholics Anonymous (www.alcoholics-anonymous.org): Check your phone book for your local chapter.  National Suicide Prevention Line (www.mentalhealthmn.org): 891-865-JUIT (0805)    General Medication Instructions:   See your medication sheet(s) for instructions.   Take all medicines as directed.  Make no changes unless your doctor suggests them.   Go to all your doctor visits.  Be sure to have all your required lab tests. This way, your medicines can be refilled on time.  Do not use any drugs not prescribed by your doctor.  Avoid alcohol.

## 2018-04-11 NOTE — PROGRESS NOTES
Pt discharged with [employer] employee at 1615 with his belongings and medications. Pt denied suicidal ideation. Mood appeared stable, affect bright.

## 2018-04-11 NOTE — PROGRESS NOTES
Discharge teaching done. Pt denied SI. Pt verbalized understanding of medications and out pt f/u [place] plan.  Belongings returned to pt at discharge. Pt is being picked up by [facility]. Packet for [facility] is ready and pt's medications were filled here to be sent with pt to [facility]. Pt's ride is due to arrive at any minute.
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
## 2018-04-29 NOTE — ED NOTES
Bed: ED17  Expected date:   Expected time:   Means of arrival:   Comments:  Triage- suicide attempt

## 2018-04-29 NOTE — IP AVS SNAPSHOT
MRN:[id number]                      After Visit Summary   4/29/2018    Raheem Jimenez    MRN: [id number]           Thank you!     Thank you for choosing [facility] for your care. Our goal is always to provide you with excellent care.        Patient Information     Date Of Birth          1965        Designated Caregiver       Most Recent Value    Caregiver    Will someone help with your care after discharge? no      About your hospital stay     You were admitted on:  April 30, 2018 You last received care in the:  [facility]    You were discharged on:  June 6, 2018       Who to Call     For medical emergencies, please call 911.  For non-urgent questions about your medical care, please call your primary care provider or clinic, [phone]          Attending Provider     Provider Specialty    Michael Tellez MD Emergency Medicine    Darryn Ritter MD Emergency Medicine    Steve Khan MD Psychiatry       Primary Care Provider Office Phone # Fax #    Julia Burgess [phone]      Further instructions from your care team       Behavioral Discharge Planning and Instructions    Summary: Admitted following suicide attempt.    Main Diagnosis: Major depression; Dysthymic disorder;Polysubstance use disorder     Major Treatments, Procedures and Findings: Psychiatric assessment; Rule 25 assessment    Symptoms to Report: feeling more aggressive, mood getting worse or thoughts of suicide    Lifestyle Adjustment: Sober lifestyle; complete treatment and engage in aftercare.Follow up with mental health medication management and supportive therapy.    Psychiatry Follow-up:     Dr. Khan  -   Call for appointment as you near end of treatment stay.   [facility]  [address] [place], [address]  [place]  [address]  Phone:  [phone]  Fax:  [phone]    [facility]- entry date 6/6/18 @ 12 noon.  [address]  "[place] [address]  [phone] fax: [phone]    Resources:   [facility] Service-   Crisis Intervention: 618.792.2281 or [phone] (TTY: [phone]).  Call anytime for help.  National [place] on Mental Illness (www.[place].kita.org): 807.975.9048 or 575-943-5677.  Alcoholics Anonymous (www.alcoholics-anonymous.org): Check your phone book for your local chapter.  National Suicide Prevention Line (www.mentalCiplexmn.org): 927-287-CURF (3197)    General Medication Instructions:   See your medication sheet(s) for instructions.   Take all medicines as directed.  Make no changes unless your doctor suggests them.   Go to all your doctor visits.  Be sure to have all your required lab tests. This way, your medicines can be refilled on time.  Do not use any drugs not prescribed by your doctor.  Avoid alcohol.      Pending Results     No orders found from 4/27/2018 to 4/30/2018.            Statement of Approval     Ordered          06/05/18 1023  I have reviewed and agree with all the recommendations and orders detailed in this document.  EFFECTIVE NOW     Approved and electronically signed by:  Steve Khan MD             Admission Information     Date & Time Provider Department Dept. Phone    4/29/2018 Steve Khan MD [facility] [phone]      Your Vitals Were     Blood Pressure Pulse Temperature Respirations Height Weight    116/70 75 98.7  F (37.1  C) (Oral) 17 1.651 m (5' 5\") 59.5 kg (131 lb 3.2 oz)    Pulse Oximetry BMI (Body Mass Index)                94% 21.83 kg/m2          MyChart Information     [facility] lets you send messages to your doctor, view your test results, renew your prescriptions, schedule appointments and more. To sign up, go to [email or web address] . Click on \"Log in\" on the left side of the screen, which will take you to the Welcome page. Then click on \"Sign up Now\" on the right side of the page.     You will be asked to " enter the access code listed below, as well as some personal information. Please follow the directions to create your username and password.     Your access code is: [email or web address]  Expires: 7/10/2018  8:13 AM     Your access code will  in 90 days. If you need help or a new code, please call your [place] clinic or [phone].        Care EveryWhere ID     This is your Care EveryWhere ID. This could be used by other organizations to access your [place] medical records  [id number]        Equal Access to Services     [facility]: Hadii elayne cardoso [place], waemilyda luqadaha, qaybsedrick kaalmaparam segovia, umang campbell . So [facility] [phone].    ATENCIÓN: Si habla español, tiene a kramer disposición servicios gratuitos de asistencia lingüística. Llame al [phone].    We comply with applicable federal civil rights laws and [place] laws. We do not discriminate on the basis of race, color, national origin, age, disability, sex, sexual orientation, or gender identity.               Review of your medicines      START taking        Dose / Directions    lamoTRIgine 150 MG tablet   Commonly known as:  LaMICtal   Used for:  Severe recurrent major depression without psychotic features (H)        Dose:  150 mg   Take 1 tablet (150 mg) by mouth daily   Quantity:  30 tablet   Refills:  0         CONTINUE these medicines which may have CHANGED, or have new prescriptions. If we are uncertain of the size of tablets/capsules you have at home, strength may be listed as something that might have changed.        Dose / Directions    QUEtiapine 300 MG 24 hr tablet   Commonly known as:  SEROquel XR   This may have changed:  Another medication with the same name was removed. Continue taking this medication, and follow the directions you see here.   Used for:  Severe single current episode of major depressive disorder, without psychotic features (H)        Dose:  300 mg   Take 1 tablet (300 mg) by mouth  At Bedtime   Quantity:  30 tablet   Refills:  0         CONTINUE these medicines which have NOT CHANGED        Dose / Directions    mirtazapine 45 MG tablet   Commonly known as:  REMERON   Used for:  Severe single current episode of major depressive disorder, without psychotic features (H)        Dose:  45 mg   Take 1 tablet (45 mg) by mouth At Bedtime   Quantity:  30 tablet   Refills:  0       sodium chloride 0.65 % nasal spray   Commonly known as:  OCEAN   Used for:  Dry skin        Dose:  1-2 spray   Spray 1-2 sprays in nostril every hour as needed for other (dry nasal passages)   Quantity:  30 mL   Refills:  0       varenicline 1 MG tablet   Commonly known as:  CHANTIX   Used for:  Tobacco abuse        Dose:  1 mg   Take 1 tablet (1 mg) by mouth 2 times daily   Quantity:  60 tablet   Refills:  0            Where to get your medicines      These medications were sent to [facility] - [address]  [address], [place] [address]     Phone:  [phone]     lamoTRIgine 150 MG tablet    mirtazapine 45 MG tablet    QUEtiapine 300 MG 24 hr tablet    varenicline 1 MG tablet                Protect others around you: Learn how to safely use, store and throw away your medicines at www.disposemymeds.org.             Medication List: This is a list of all your medications and when to take them. Check marks below indicate your daily home schedule. Keep this list as a reference.      Medications           Morning Afternoon Evening Bedtime As Needed    lamoTRIgine 150 MG tablet   Commonly known as:  LaMICtal   Take 1 tablet (150 mg) by mouth daily   Last time this was given:  150 mg on 6/6/2018  8:04 AM                                   mirtazapine 45 MG tablet   Commonly known as:  REMERON   Take 1 tablet (45 mg) by mouth At Bedtime   Last time this was given:  45 mg on 6/5/2018 10:03 PM                                   QUEtiapine 300 MG 24 hr tablet   Commonly known as:   SEROquel XR   Take 1 tablet (300 mg) by mouth At Bedtime   Last time this was given:  300 mg on 6/5/2018 10:03 PM                                   sodium chloride 0.65 % nasal spray   Commonly known as:  OCEAN   Spray 1-2 sprays in nostril every hour as needed for other (dry nasal passages)   Last time this was given:  2 sprays on 6/3/2018  2:54 PM                                   varenicline 1 MG tablet   Commonly known as:  CHANTIX   Take 1 tablet (1 mg) by mouth 2 times daily   Last time this was given:  1 mg on 6/6/2018  8:04 AM

## 2018-04-29 NOTE — IP AVS SNAPSHOT
[facility] [address] [place] [address]    Phone:  [phone]                                       After Visit Summary   4/29/2018    Raheem Jimenez    MRN: [id number]           After Visit Summary Signature Page     I have received my discharge instructions, and my questions have been answered. I have discussed any challenges I see with this plan with the nurse or doctor.    ..........................................................................................................................................  Patient/Patient Representative Signature      ..........................................................................................................................................  Patient Representative Print Name and Relationship to Patient    ..................................................               ................................................  Date                                            Time    ..........................................................................................................................................  Reviewed by Signature/Title    ...................................................              ..............................................  Date                                                            Time

## 2018-04-29 NOTE — ED PROVIDER NOTES
"  History     Chief Complaint:  Suicide Attempt     HPI   Raheem Jimenez \"Kip\" is a 52 year old male with history of depression and suicidal ideation who presents to the emergency department today for evaluation after a suicide attempt. The patient reports this afternoon he felt complete \"despair and loneliness\" and was driving around looking for a location to hang himself. He then hung himself from a [place] branch [place] but then states he \"couldn't go through with it\" and struggled to untie himself from the rope. He is unsure how long he was hanging from the rope. Here, the patient endorses a headache as well as some neck pain from where the rope was wrapped around his neck, but denies any chest pain, shortness of breath, nausea, vision problems, or any numbness/tingling/weakness. He reports history of major depression for which he is followed by Dr. Khan, most recently seeing him judd past week. The patient denies any changes made at that visit, and denies any specific stressor since. However he does endorse stress in his life due to a court appearance next week due to a restraining order from a pending divorce, as well as some gender dysphoria. He denies any alcohol or drug use today, and is currently living in a sober home.     Allergies:  No Known Drug Allergies     Medications:   mirtazapine (REMERON) 45 MG tablet   QUEtiapine (SEROQUEL XR) 300 MG 24 hr tablet   QUEtiapine (SEROQUEL) 50 MG tablet     Past Medical History:    Depression  Suicide attempt   Kidney stone     Past Surgical History:    Knee surgery   Orthopedic shoulder surgery     Family History:    History reviewed. No pertinent family history.      Social History:  The patient was accompanied to the ED by himself.  Smoking Status: former  Smokeless Tobacco: No  Alcohol Use: No    Marital Status:       Review of Systems   Eyes: Negative for visual disturbance.   Respiratory: Negative for shortness of breath.  "   Cardiovascular: Negative for chest pain.   Gastrointestinal: Negative for anal bleeding and nausea.   Musculoskeletal: Positive for neck pain.   Neurological: Positive for headaches. Negative for weakness and numbness.   Psychiatric/Behavioral: Positive for self-injury and suicidal ideas.   All other systems reviewed and are negative.    Physical Exam   First Vitals:  BP: 116/71  Heart Rate: 101  Temp: 98.5  F (36.9  C)  Resp: 18  Weight: 58.1 kg (128 lb)  SpO2: 97 %    Physical Exam  SKIN:  Warm and dry.  HEMATOLOGIC/IMMUNOLOGIC/LYMPHATIC:  No pallor.  HENT:  No facial or scalp trauma.  No oral or dental trauma.  Normal phonation.  No stridor.  ROM head/neck exacerbates anterior and posterior neck pain.  Soft tissue swelling of the bilateral anterior neck with ecchymosis in a ligature pattern.  EYES:  PERRL, no ocular trauma, normal EOM.  CARDIOVASCULAR:  Normal rate and a regular rhythm.  No carotid bruit.  RESPIRATORY:  No respiratory distress, breath sounds equal and normal.  GASTROINTESTINAL:  Soft nontender abdomen.  MUSCULOSKELETAL:  Full painless ROM of the torso/limbs.  Cervical spine non-tender.  Normal body habitus.  NEUROLOGIC:  Alert, conversant, GCS 15.  Oriented.  No aphasia.  No dysarthria.  No gross motor or sensory deficit.  No ataxia.  PSYCHIATRIC:  Depressed mood and flat affect.  Tearful.      Emergency Department Course     Imaging:  Radiology findings were communicated with the patient who voiced understanding of the findings.    CT Cervical spine w/o contrast:  IMPRESSION:   1. Inflammation and subcutaneous stranding in the anterior right neck  as described above likely due to the patient's injury. No loculated  fluid collection or abscess is identified. No evidence of injury to  the thyroid cartilage or hyoid.  2. No evidence of vascular injury in the neck.  3. No evidence of acute fracture or subluxation of the cervical spine.  Postoperative changes from C5 to T1.  4. Emphysematous  changes in the visualized lung apices.    Report per radiology      Head/neck angiogram CT w and w/o contrast:  IMPRESSION:   1. Inflammation and subcutaneous stranding in the anterior right neck  as described above likely due to the patient's injury. No loculated  fluid collection or abscess is identified. No evidence of injury to  the thyroid cartilage or hyoid.  2. No evidence of vascular injury in the neck.  3. No evidence of acute fracture or subluxation of the cervical spine.  Postoperative changes from C5 to T1.  4. Emphysematous changes in the visualized lung apices.   Report per radiology      Laboratory:  Laboratory findings were communicated with the patient who voiced understanding of the findings.    CBC: WBC 13.2 (H), HGB 14.7,   CMP: Potassium 3.3 (L), o/w WNL. (Creatinine 0.78)   Salicylate level: 4  Acetaminophen level: <2  Alcohol ethyl: <0.01    Drug abuse screen 77 urine: Positive for cannabinoids, o/w negative.      Interventions:  2214 Toradol 15 mg IV        Emergency Department Course:  Nursing notes and vitals reviewed.  2002 I performed an exam of the patient as documented above.   The patient was sent for the above scans while in the emergency department, results above.    IV was inserted and blood was drawn for laboratory testing, results above.   The patient provided a urine sample here in the emergency department. This was sent for laboratory testing, findings above.   2230 Patient rechecked and updated.    I personally reviewed the laboratory and imaging results with the Patient and answered all related questions prior to sign out.  Impression & Plan      Medical Decision Making:  This patient presents after a suicide attempt by hanging. Opted for vascular imaging in addition to cervical spine bone reconstructed images. Gladly these tests were negative. There is a fair amount of soft tissue swelling but his airway is certainly intact and patent and there is no expanding hematoma  while observed in the emergency department. Other testing was negative. Barring cannabis on the urine which is expected. The patient has certainly a concerning history and will need admission for mental health stabilization. Toradol given for pain. He awaits formal DEC assessment and placement. I signed the patient out to my colleague Dr. Ritter at shift change to facilitate disposition.     Diagnosis:    ICD-10-CM    1. Suicide attempt by hanging, initial encounter (H) T71.162A Drug abuse screen 77 urine ([place],,)   2. Contusion of neck, initial encounter S10.93XA        Disposition:  Signed out to Dr. Ritter.      Scribe Disclosure:  I, Walkermontana Kendrick, am serving as a scribe at 6:57 PM on 4/29/2018 to document services personally performed by Michael Tellez MD based on my observations and the provider's statements to me.    4/29/2018    EMERGENCY DEPARTMENT       Michael Tellez MD  04/30/18 0019

## 2018-04-30 PROBLEM — T71.162S: Status: ACTIVE | Noted: 2018-01-01

## 2018-04-30 NOTE — PROGRESS NOTES
Patient has a ligature wounds on his neck,  Anterior and posterior part, is red, not drainage, painful, rated pain at 5.  No evidence of fracture.

## 2018-04-30 NOTE — PHARMACY-ADMISSION MEDICATION HISTORY
Admission Medication History    Admission medication history interview status for the 4/29/2018 admission is complete. See EPIC admission navigator for prior to admission medications     Medication history source reliability:Good    Actions taken by pharmacist (provider contacted, etc):None     Additional medication history information not noted on PTA med list :None    Medication reconciliation/reorder completed by provider prior to medication history? No    Time spent in this activity: 10 minutes    Prior to Admission medications    Medication Sig Last Dose Taking? Auth Provider   mirtazapine (REMERON) 45 MG tablet Take 1 tablet (45 mg) by mouth At Bedtime 4/28/2018 at HS Yes Jhon Alvarez MD   QUEtiapine (SEROQUEL XR) 300 MG 24 hr tablet Take 1 tablet (300 mg) by mouth At Bedtime 4/28/2018 at HS Yes Jhon Alvarez MD   QUEtiapine (SEROQUEL) 50 MG tablet Take 1 tablet (50 mg) by mouth 3 times daily as needed (anxiety)  at PRN Yes Jhon Alvarez MD   sodium chloride (OCEAN) 0.65 % nasal spray Spray 1-2 sprays in nostril every hour as needed for other (dry nasal passages)  at PRN Yes Jhon Alvarez MD   varenicline (CHANTIX) 1 MG tablet Take 1 tablet (1 mg) by mouth 2 times daily 4/29/2018 at AM Yes Jhon Alvarez MD David S. Cline, PharmD

## 2018-04-30 NOTE — PROGRESS NOTES
04/30/18 1214   Patient Belongings   Did you bring any home meds/supplements to the hospital?  No   Patient Belongings other (see comments)   Disposition of Belongings [facility]   Belongings Search Yes   Clothing Search Yes   Second Staff Willie     Ear rings x 4  Smart phone  bluetooth ear pice  Shoes with laces  hoodie with no strings  Shirt  Jeans  Belt  Bra  Breast filler/implant x 2  Purse  Tank top  Pack of cigarettes  Lighter  Watch/fitbit  Key  Necklace x 2  Coins  [place] ID  [employer] insurance card  Misc. Papers and receipts    Security envelope #[id number]  SS card  Visa #[id number]  [employer] GC #[id number]    A               Admission:  I am responsible for any personal items that are not sent to the safe or pharmacy.  [facility] is not responsible for loss, theft or damage of any property in my possession.    Signature:  _________________________________ Date: _______  Time: _____                                              Staff Signature:  ____________________________ Date: ________  Time: _____      2nd Staff person, if patient is unable/unwilling to sign:    Signature: ________________________________ Date: ________  Time: _____     Discharge:  [facility] has returned all of my personal belongings:    Signature: _________________________________ Date: ________  Time: _____                                          Staff Signature:  ____________________________ Date: ________  Time: _____

## 2018-04-30 NOTE — H&P
Admitted:     04/29/2018      PRIMARY CARE PHYSICIAN:  Julia Burgess NP      PSYCHIATRIST:  Steve Khan MD      THERAPIST:  [facility]      CHIEF COMPLAINT:  Psychiatry history and physical.      HISTORY OF PRESENT ILLNESS:  Raheem Jimenez is a 52-year-old transgender male, who prefers the pronoun she, who presented on 04/29/2018 post hanging.  The patient was feeling hopeless, lonely and despair after [employer], who subsequently planned to hang herself from a tree in [place].  The patient went through with the process, but decided against and was able to climb up a nearby tree to rescue herself.  The patient then drove herself to the Emergency Department for further evaluation.  While in the Emergency Department, the patient received Toradol and Norco for pain management.  The patient also underwent imaging of her neck which revealed inflammation and subcutaneous stranding in the anterior right neck.  No evidence of injury to the thyroid cartilage or hyoid.  No evidence of vascular injury in the neck.  No evidence of acute fracture or subluxation of the cervical spine, and also noted more emphysematous changes in the visualized lung apices.  The patient's laboratory studies revealed a potassium of 3.3 and a WBC of 13.2.  A recent TSH in 03/2018 was 2.79.  The patient's urine drug screen was positive for cannabinoids; otherwise, negative.  The patient's alcohol level was negative on arrival.  The patient was subsequently placed on a 72-hour hold at that time.      Presently, the patient is seen in the intensive psychiatry unit.  The patient was sitting in the shared living area watching TV.  The patient ambulated back to room without difficulty.  The patient reports 8/10 neck pain, throbbing and achy in nature.  The patient reports slight swelling of the neck causing difficulty with swallowing and coughing due to increased pain.  The patient reports chronic neck pain at baseline with bilateral  finger radiculopathy, left greater than right.  Of note, the patient was recently at [address] [place] [address] chemical dependency treatment facility and was subsequently discharged to a sober house.  The patient reports most recent use of cocaine was in 03/2018 and marijuana on 03/31/2018.      PAST MEDICAL HISTORY:   1.  Major depressive disorder, severe.   2.  Cocaine use disorder.   3.  Marijuana use disorder.   4.  History of nephrolithiasis.   5.  Chronic neck pain.   6.  History of bulimia and anorexia.   7.  Suicide attempts in the past.      PAST SURGICAL HISTORY:     1.  Right rotator cuff surgery.   2.  Anterior and posterior cervical fusion C5 to T2.      SOCIAL HISTORY:   1.  Tobacco:  Half-pack-per-day smoker off and on since he was a teenager.   2.  Alcohol:  None currently.   3.  Illicit drugs:  Last cocaine use in 03/2018; last marijuana use, 03/31/2018.   4.  Lives in a sober house currently.      FAMILY HISTORY:   1.  Father:  Diabetes, heart disease, hypertension, myocardial infarction.   2.  Mother:  Diabetes, heart disease, hypertension, myocardial infarction, cerebrovascular accident, and lung cancer.   3.  Maternal grandmother:  Cancer.      ALLERGIES:  NO KNOWN DRUG ALLERGIES.      MEDICATIONS:    Prior to Admission medications    Medication Sig Last Dose Taking? Auth Provider   mirtazapine (REMERON) 45 MG tablet Take 1 tablet (45 mg) by mouth At Bedtime 4/28/2018 at HS Yes Jhon Alvarez MD   QUEtiapine (SEROQUEL XR) 300 MG 24 hr tablet Take 1 tablet (300 mg) by mouth At Bedtime 4/28/2018 at HS Yes Jhon Alvarez MD   QUEtiapine (SEROQUEL) 50 MG tablet Take 1 tablet (50 mg) by mouth 3 times daily as needed (anxiety)  at PRN Yes Jhon Alvarez MD   sodium chloride (OCEAN) 0.65 % nasal spray Spray 1-2 sprays in nostril every hour as needed for other (dry nasal passages)  at PRN Yes Jhon Alvarez MD   varenicline (CHANTIX) 1 MG tablet Take 1 tablet (1 mg) by mouth 2 times daily  "4/29/2018 at AM Yes Jhon Alvarez MD     REVIEW OF SYSTEMS:  A 10-point review of systems was completed.  All pertinent positives are noted in HPI.  All other systems negative.      PHYSICAL EXAMINATION:   VITAL SIGNS:  Reviewed and are as follows:  Temperature 98.6, blood pressure 105/66, heart rate 74, respiratory rate 16, O2 sat is 94% on room air.   CONSTITUTIONAL:  The patient sitting in a chair, awake, alert, cooperative, in no apparent distress and appears stated age, thin appearing.     HEENT:  Pupils equal, round and reactive to light.  Extraocular muscles intact.  Sclerae are clear.  Normocephalic, atraumatic.  Oropharynx shows moist mucous membranes.   NECK:  Supple.  No adenopathy.  Minimal decrease in range of motion due to subjective \"neck pain.\"  No nuchal rigidity noted.  Ligature marks noted right anterolateral aspect of the neck around the midline to the left lateral anterior neck to left posterior lateral upper neck.  No open wounds noted.  Erythema surrounding entire ligature andreea.  No drainage present.  No increased warmth to palpation.  Tenderness with palpation both upper and lower aspects of ligature markings.   LUNGS:  No increased work of breathing.  Clear to auscultation bilaterally.  No crackles or wheezing noted.   CARDIOVASCULAR:  Normal apical pulse.  Regular rate and rhythm.  Normal S1 and S2.  No murmur, rub or gallop.   ABDOMEN:  Normal bowel sounds, soft, nondistended, nontender.  No masses are palpated.  No guarding or rebound tenderness noted.     EXTREMITIES:  Moves all 4 extremities.  Dorsalis pedis and radial pulses palpable bilaterally lower extremities with no edema.   NEUROLOGIC:  Awake, alert, oriented.  Cranial nerves II-XII are grossly intact.  Sensory is intact.  Speech fluent.   SKIN:  Warm and dry.  No rash noted.  Erythema noted around ligature marks.   PSYCHIATRIC:  Mentation appears sad, depressed, and affect flat.      LABORATORY DATA:  Reviewed in Epic. "      ASSESSMENT AND PLAN:  Raheem Jimenez is a 52-year-old transgender male who presented on 04/29/2018 post suicidal attempt by hanging herself.  The patient has been admitted to Psychiatry for further evaluation and management.      Suicide attempt by hanging.  Chronic neck pain.   -Imaging performed while in the Emergency Department noting inflammation in the subcutaneous stranding on the anterior right neck.  No fluid collection or abscesses identified.  No injury to thyroid cartilage or hyoid.  No vascular injury in the neck.  No acute fracture or subluxation of the cervical spine.   -P.r.n. Bacitracin and acetaminophen ordered.  -If pt reports ongoing difficulty with swallowing and possible aspiration, consider speech therapy for swallow evaluation.      Major depressive disorder, severe, recurrent.   Marijuana and cocaine use disorder.     History of eating disorder, bulimia and anorexia.   -The above diagnosis same as per Primary Service Psychiatry.   -On mirtazapine and quetiapine prior to admission.  Defer to Primary.      Mild hypokalemia.    -Potassium on admission 3.3.  Will repeat now, up to 4.0.      Mild leukocytosis, suspect stress induced.    -WBC on admission 13.2.  Will repeat now, down to 9.0.    -No infectious signs or symptoms noted at this time.  Will defer any initiation of antibiotics at this time.      Tobacco use disorder.     -Encourage smoking cessation.  The patient prior to admission on Chantix daily has been resumed.      Deep vein thrombosis prophylaxis.  -Low VTE risk, encourage ambulation and out of bed daily while the patient awake.      Pain Assessment.  Current Pain Score 4/30/2018   Patient currently in pain? yes   Pain score (0-10) 5   Pain location Neck   Pain descriptors Sore;Aching   - Raheem is experiencing neck pain due to recent hanging. Pain management was discussed and the plan was created in a collaborative fashion.  Raheem's response to the current  recommendations: compliant  - Pharmacologic adjuvants: Acetaminophen     CODE STATUS:  Full code.      DISPOSITION:  Per Primary Service, currently on a 72-hour hold.      This patient was discussed with Dr. Carmina Marquez of the hospitalist service, who agreed with current plans as outlined above.         CARMINA MARQUEZ MD       As dictated by SHERI HOLT NP            D: 2018   T: 2018   MT: DT      Name:     TOMASA RENDON   MRN:      [id number]        Account:      [id number]   :      1965        Admitted:     2018                   Document: [id number]

## 2018-04-30 NOTE — ED PROVIDER NOTES
The patient was signed out for me awaiting mental health bed placement.  Briefly, she presented after an attempt to hang herself.  Bruising and abrasions to her neck were noted, CT imaging is unremarkable, per Dr. Tellez's evaluation.    Overnight, the patient remained calm and cooperative.  She requested something for her neck pain.  Initially she got a dose of Norco.  Ibuprofen was ordered later in the night, after pain increased, as her previously dosed Toradol wore off.  She also requested something for anxiety and sleep.  Normally she would take Seroquel for this, but did not want the full 300 mg nighttime dose, fearing that she would sleep throughout the day.  She took her usual 50 mg as needed dose.    Patient is signed out to Dr. Augustine at 8 AM, still awaiting bed placement.     Darryn Ritter MD  04/30/18 [id number]
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
## 2018-05-01 NOTE — PLAN OF CARE
Problem: Suicidal Behavior (Adult)  Goal: Suicidal Behavior is Absent/Minimized/Managed  Outcome: Declining  Pt visible in lounge all shift. Visited with his mom and cousin. Appears depressed. Did not have much to offer during attempted 1:1. Asked for tylenol and PRN polysporin. Able to advocate for self. Returned bra and inserts at end of shift. Both items placed in locker.

## 2018-05-01 NOTE — PLAN OF CARE
"Problem: Depressive Symptoms  Goal: Depressive Symptoms  Signs and symptoms of listed problems will be absent or manageable.   Outcome: No Change  Flat affect, mood calm, appears sad, denies any SI or self harm intent. Pt states that he \"feels safe here\". Visible all shift out in ITC lounge and over in SDU. Attended groups and was appropriate. C/O of neck pain, prn Tylenol given. Antibiotic ointment applied to neck wound, wound is healing, with no drainage noted.       "

## 2018-05-01 NOTE — PLAN OF CARE
Problem: Depressive Symptoms  Goal: Depressive Symptoms  Signs and symptoms of listed problems will be absent or manageable.  Outcome: Declining  Pt visible in lounge all shift. Visited with his mom and cousin. Appears depressed. Did not have much to offer during attempted 1:1. Asked for tylenol and PRN polysporin. Able to advocate for self. Returned bra and inserts at end of shift. Both items placed in locker.
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
## 2018-05-02 NOTE — PLAN OF CARE
Problem: Depressive Symptoms  Goal: Depressive Symptoms  Signs and symptoms of listed problems will be absent or manageable.   Outcome: No Change  Pt spent all evening watching movies and socializing with peers. She has been laughing and joking with peers, but presents with a mostly blunted affect and depressed mood. She is worried that she may not have transportation to court on Thursday (Domestic). Pt was advised to speak with his Dr. Or SW tomorrow morning.       Pt prefers the pronouns: she/her/hers   Please use these pronouns when talking to or about pt.

## 2018-05-02 NOTE — PLAN OF CARE
Problem: Depressive Symptoms  Goal: Depressive Symptoms  Signs and symptoms of listed problems will be absent or manageable.   Pt spent all evening watching movies and socializing with peers. She has been laughing and joking with peers, but presents with a mostly blunted affect and depressed mood. She is worried that she may not have transportation to court on Thursday (Domestic). Pt was advised to speak with her  Or SW tomorrow morning. Attended most groups and participated appropriately.

## 2018-05-03 NOTE — PROGRESS NOTES
"[facility] Psychiatric Progress Note       Interim History     The patient's care was discussed with the treatment team and chart notes were reviewed. Pt seen on SDU. Tolerating medications without side effects. Side effects, risks, and benefits of medications reviewed with patient. Pt making some progress with medications and more time off Cannabis. No side effects.       Hospital Course     On May 1, 2018, pt was admitted due to worsening depression with an attempt to hang herself. She was started on a Lamictal titration at 25mg qam. On May 2, 2018, pt reported feeling slightly less depressed with Lamictal. She was encouraged to contact the [facility] in order to defer her upcoming court appearance as she is presently in the hospital.     Medications     Current Facility-Administered Medications Ordered in Epic   Medication Dose Route Frequency Last Rate Last Dose     acetaminophen (TYLENOL) tablet 1,000 mg  1,000 mg Oral Q6H PRN   1,000 mg at 05/01/18 0819     bacitracin-polymyxin b (POLYSPORIN) ointment   Topical BID PRN         hydrOXYzine (ATARAX) tablet 25 mg  25 mg Oral Q4H PRN         lamoTRIgine (LaMICtal) tablet 25 mg  25 mg Oral Daily   25 mg at 05/03/18 0819     mirtazapine (REMERON) tablet 45 mg  45 mg Oral At Bedtime   45 mg at 05/02/18 2110     QUEtiapine (SEROquel XR) 24 hr tablet 300 mg  300 mg Oral At Bedtime   300 mg at 05/02/18 2110     QUEtiapine (SEROquel) tablet 50 mg  50 mg Oral TID PRN         sodium chloride (OCEAN) 0.65 % nasal spray 1-2 spray  1-2 spray Nasal Q1H PRN   2 spray at 05/03/18 1423     varenicline (CHANTIX) tablet 1 mg  1 mg Oral BID   1 mg at 05/03/18 0819     No current [facility]-ordered outpatient prescriptions on file.         Allergies      No Known Allergies     Medical Review of Systems     /78  Pulse 68  Temp 97.6  F (36.4  C) (Oral)  Resp 16  Ht 1.651 m (5' 5\")  Wt 57.2 kg (126 lb 1.6 oz)  SpO2 94%  BMI 20.98 kg/m2  Body " "mass index is 20.98 kg/(m^2).  A 10-point review of systems was performed by Steve Khan MD and is negative, no new findings.      Psychiatric Examination     Appearance Sitting in chair, dressed in casual clothes. Appears stated age. Ligature wound   Attitude Cooperative   Orientation Oriented to person, place, time   Eye Contact Better   Speech Regular rate, rhythm, volume and tone   Language Normal   Psychomotor Behavior Normal   Mood Depressed but a little less   Affect Flat   Thought Process Goal-Oriented, Intact   Associations Intact   Thought Content Patient is currently negative for suicidal ideation, negative for plan or intent, able to contract no self harm and identify barriers to suicide.  Negative for obsessions, compulsions or psychosis.      Fund of Knowledge Average   Insight Improving   Judgement Impaired   Attention Span & Concentration Improving   Recent & Remote Memory Improving   Gait Normal   Muscle Tone Intact        Labs     Labs reviewed.  No results found for this or any previous visit (from the past 24 hour(s)).     Impression     Ms. \"Kippy\" Raheem Jimenez is a 52-year-old  transgender male with a long history of mental health and polysubstance use disorder. Has been sober for a number of years. Relapsed and has been smoking marijuana for the last 4 without telling Dr. Khan. She was admitted to [facility] and was discharged to [facility]. She was eventually removed from the outpatient sober housing and attempted to hang herself. Discussed starting pt on Lamictal.  Reviewed the side effects, benefits, and complications of medication. Pt gave verbal agreement to begin Lamictal 25mg qam with intent to titrate. She will remain on the unit for stabilization.         Diagnoses     1. Major depression, recurrent, severe, without psychotic features.  2. Dysthymic disorder.  3. Polysubstance use disorder.     Plan     1. Explained side effects, benefits, and " complications of medications to the patient, Pt gave verbal consent.  2. Medication changes: Continue Lamictal titration.  3. Discussed treatment plan with patient and team.  4. Projected length of stay: Until pt has been stabilized with aftercare in place.      Attestation:   Patient has been seen and evaluated by me, Steve Khan MD.    Patient ID:  Name: Raheem Jimenez  MRN: [id number]  Admission: 4/29/2018   YOB: 1965

## 2018-05-03 NOTE — PLAN OF CARE
Problem: Patient Care Overview  Goal: Team Discussion  Team Plan:   Outcome: No Change  BEHAVIORAL TEAM DISCUSSION    Participants: Dr. Khan, Social Workers, Nursing Staff, and P.A.'s  Progress: No change  Continued Stay Criteria/Rationale: Until stabilized with aftercare in place  Medical/Physical: N/A  Precautions:   Behavioral Orders   Procedures     Code 1 - Restrict to Unit     Routine Programming     As clinically indicated     Status 15     Every 15 minutes.     Suicide precautions     Patients on Suicide Precautions should have a Combination Diet ordered that includes a Diet selection(s) AND a Behavioral Tray selection for Safe Tray - with utensils, or Safe Tray - NO utensils       Plan: Obtain Rule 25 when current insurance runs out. Possible discharge to [facility]. Continue hospitalization until stabilized.  Rationale for change in precautions or plan: Stabilization

## 2018-05-03 NOTE — PLAN OF CARE
"Problem: Depressive Symptoms  Goal: Depressive Symptoms  Signs and symptoms of listed problems will be absent or manageable.   Outcome: Improving  Pt reports that she is doing, \"Alright.\" Feels ready to begin OP tx.  She hope [facility] will be a better fit for tx.  She liked her counselor at [facility] and felt she was making progress with her feelings of shame.  Discussed the circumstances that lead to hospitalization and feels that the lack of structure and job loss were significant stressors.  Reports feeling less overwhelmed, no longer doomed, and ready to move forward.  She was able to call the courts and talk with her  about tomorrow.  Discussed relationship issues with her wife and father.  Denied SI.  Reports that her neck is better but still slightly swollen, \"sharp, achy, worse when I swallow or stretch it feels like a tight muscle.\"  Appears flat and depressed, but improving.      "

## 2018-05-03 NOTE — PLAN OF CARE
Problem: Depressive Symptoms  Goal: Depressive Symptoms  Signs and symptoms of listed problems will be absent or manageable.   Outcome: No Change  Pt has been flat all day, but calm and cooperative. Attended and participated in all groups, spent downtime in the lounge.  Denies SI.  Showered, shaved, and ate all meals.

## 2018-05-04 NOTE — PLAN OF CARE
Problem: General Plan of Care (Inpatient Behavioral)  Goal: Discharge Planning  Attended Process Group on 5/1,5/2,5/3 and 5/4. Participation complete and appropriate.

## 2018-05-04 NOTE — PROGRESS NOTES
"Participated in Music Therapy group focused on social and emotional skill building through music listening and response/reflection.  Engaged and cooperative. Chose songs: \"I feel like a woman\" and \"Born this way\".  Appeared thoughtful during group.    "

## 2018-05-04 NOTE — PLAN OF CARE
"Problem: Depressive Symptoms  Goal: Depressive Symptoms  Signs and symptoms of listed problems will be absent or manageable.   Outcome: Improving  Pt was calm in mood. Pleasant and cooperative. Spent the shift in the lounge watching TV and reading the paper. Mostly kept to herself. Stated she is doing \"alright\" today. Is unclear about what the plan is moving forward, and is hoping to attend inpatient treatment, and continue therapy. Also hopes to hear from her  regarding court yesterday, as she was unable to attend.       "

## 2018-05-04 NOTE — PLAN OF CARE
"Problem: Depressive Symptoms  Goal: Depressive Symptoms  Signs and symptoms of listed problems will be absent or manageable.   Outcome: No Change  Pt was calm and cooperative throughout the shift. Minimally social with staff and peers but brightens upon approach. Attended groups and participated appropriately. Spent time watching TV in the lounge. Wound on neck seems to be healing. Stated she was doing \"fine\". Able to make thoughts and needs known. Med compliant.      "

## 2018-05-05 NOTE — PLAN OF CARE
"Problem: Depressive Symptoms  Goal: Depressive Symptoms  Signs and symptoms of listed problems will be absent or manageable.   Outcome: No Change  Pleasant, cooperative, and visible on the unit.  Continues to look forward to going to [facility] when funding is available, but would like it to be a quicker process.  Also looking forward to getting a therapist and starting hormones.  States that she is no longer living her life for her family which has helped.  Continues awaiting to hear from her . She is working on ways to maintain safety and not fall into a deep depression again.  Reports feeling \"OK.\"  Appears sad and depressed but brightens during interaction.      "

## 2018-05-05 NOTE — PLAN OF CARE
"Problem: Depressive Symptoms  Goal: Depressive Symptoms  Signs and symptoms of listed problems will be absent or manageable.   Outcome: Improving  Pt spent the shift visible in the lounge watching TV. Is calm in mood. Pleasant and cooperative. Attended groups appropriately. Reports feeling \"okay\" today.       "

## 2018-05-06 NOTE — PLAN OF CARE
Problem: Depressive Symptoms  Goal: Depressive Symptoms  Signs and symptoms of listed problems will be absent or manageable.   Outcome: No Change  Pt spent the majority of the shift in the lounge watching tv. Upset about not being able to get a straight edge razor per unit policy (See RN notes). Painted her nails and attended community meeting and spirituality group. Upset and tense in affect.

## 2018-05-06 NOTE — PROGRESS NOTES
Dr Parada Patient still upset about not using straight edge razor and wants to leave(see above note). This writer states that he had a serious Suicide attempt and he states that he should have gone through with it. This writer said this is why you need to stay we need to make sure you are safe and have a safe disposition

## 2018-05-06 NOTE — PLAN OF CARE
"Problem: Depressive Symptoms  Goal: Depressive Symptoms  Signs and symptoms of listed problems will be absent or manageable.   Outcome: Improving  Spent most of the shift watching movies.  Reports feeling \"Alright.\"  Attended wrap up group and participated with prompts.  Pt wishes she was not on this unit but is hopeful to begin tx at [facility].  She feels as though she is starting over. Says that some days are easy and some are hard, but is have a good day.  Appears flat and depressed.      "

## 2018-05-06 NOTE — PROGRESS NOTES
"Patient came up to the desk and wanted to use a straight edge razor to shave that she brought from home.  She was told that it is our policy to not use straight edge razors.  She was angry and asked for the battery operated razor.  She walked away angry and stated \"get my discharge papers ready\".  Dr. Khan was informed and he said that if she continues to demand to leave that he will place a 72 hold. This was relayed to the patient.  She walked past the person telling her this and made no eye contact and said \"fine\" and walked away.  "

## 2018-05-07 NOTE — PROGRESS NOTES
[facility] Psychiatric Progress Note       Interim History     The patient's care was discussed with the treatment team and chart notes were reviewed. Pt seen on SDU. Tolerating medications without side effects. Side effects, risks, and benefits of medications reviewed with patient. Pt requested being able to use a razor blade to shave, she was informed that no one was allowed to use 2nd to safety concerns for both pt and other patients. Pt was reminded of the serious suicide attempt he made leading to the current hospitalization. (she still has visible raw skin on neck where he attempted to hang herself) Pt wanted to sign out and she was told that she would be put on a 72 hold and commitment filed if she signed a 12 hour intent.     Hospital Course     On May 1, 2018, pt was admitted due to worsening depression with an attempt to hang herself. She was started on a Lamictal titration at 25mg qam. On May 2, 2018, pt reported feeling slightly less depressed with Lamictal. She was encouraged to contact the [facility] in order to defer her upcoming court appearance as she is presently in the hospital. Pt requested being able to use a razor blade to shave, she was informed that no one was allowed to use 2nd to safety concerns for both pt and other patients. Pt was reminded of the serious suicide attempt he made leading to the current hospitalization. (she still has visible raw skin on neck where he attempted to hang herself) Pt wanted to sign out and she was told that she would be put on a 72 hold and commitment filed if she signed a 12 hour intent     Medications     Current Facility-Administered Medications Ordered in Epic   Medication Dose Route Frequency Last Rate Last Dose     acetaminophen (TYLENOL) tablet 1,000 mg  1,000 mg Oral Q6H PRN   1,000 mg at 05/05/18 2002     bacitracin-polymyxin b (POLYSPORIN) ointment   Topical BID PRN         hydrOXYzine (ATARAX) tablet 25 mg  25 mg  "Oral Q4H PRN         lamoTRIgine (LaMICtal) tablet 25 mg  25 mg Oral Daily   25 mg at 05/06/18 0907     mirtazapine (REMERON) tablet 45 mg  45 mg Oral At Bedtime   45 mg at 05/06/18 2137     QUEtiapine (SEROquel XR) 24 hr tablet 300 mg  300 mg Oral At Bedtime   300 mg at 05/06/18 2134     QUEtiapine (SEROquel) tablet 50 mg  50 mg Oral TID PRN         sodium chloride (OCEAN) 0.65 % nasal spray 1-2 spray  1-2 spray Nasal Q1H PRN   2 spray at 05/05/18 2000     varenicline (CHANTIX) tablet 1 mg  1 mg Oral BID   1 mg at 05/06/18 2134     No current Epic-ordered outpatient prescriptions on file.         Allergies      No Known Allergies     Medical Review of Systems     /73  Pulse 68  Temp 98.8  F (37.1  C) (Oral)  Resp 16  Ht 1.651 m (5' 5\")  Wt 57.2 kg (126 lb 1.6 oz)  SpO2 94%  BMI 20.98 kg/m2  Body mass index is 20.98 kg/(m^2).  A 10-point review of systems was performed by Steve Khan MD and is negative, no new findings.      Psychiatric Examination     Appearance Sitting in chair, dressed in casual clothes. Appears stated age. Ligature wound   Attitude Cooperative   Orientation Oriented to person, place, time   Eye Contact Better   Speech Regular rate, rhythm, volume and tone   Language Normal   Psychomotor Behavior Normal   Mood Depressed but a little less   Affect Flat   Thought Process Goal-Oriented, Intact   Associations Intact   Thought Content Patient is currently negative for suicidal ideation, negative for plan or intent, able to contract no self harm and identify barriers to suicide.  Negative for obsessions, compulsions or psychosis.      Fund of Knowledge Average   Insight Impaired   Judgement Impaired   Attention Span & Concentration Improving   Recent & Remote Memory Improving   Gait Normal   Muscle Tone Intact        Labs     Labs reviewed.  No results found for this or any previous visit (from the past 24 hour(s)).     Impression     Ms. \"Kippy\" Raheem Jimenez is a 52-year-old "  transgender male with a long history of mental health and polysubstance use disorder. Has been sober for a number of years. Relapsed and has been smoking marijuana for the last 4 without telling Dr. Khan. She was admitted to [facility] and was discharged to [facility]. She was eventually removed from the outpatient sober housing and attempted to hang herself. Discussed starting pt on Lamictal.  Reviewed the side effects, benefits, and complications of medication. Pt gave verbal agreement to begin Lamictal 25mg qam with intent to titrate. She will remain on the unit for stabilization.         Diagnoses     1. Major depression, recurrent, severe, without psychotic features.  2. Dysthymic disorder.  3. Polysubstance use disorder.     Plan     1. Explained side effects, benefits, and complications of medications to the patient, Pt gave verbal consent.  2. Medication changes: Continue Lamictal titration.  3. Discussed treatment plan with patient and team.  4. Projected length of stay: Until pt has been stabilized with aftercare in place.  5. Place on 72 hour hold if pt tries to sign out of hospital      Attestation:   Patient has been seen and evaluated by me, Steve Khan MD.    Patient ID:  Name: Raheem Jimenez  MRN: [id number]  Admission: 4/29/2018   YOB: 1965

## 2018-05-07 NOTE — PLAN OF CARE
Problem: Depressive Symptoms  Goal: Depressive Symptoms  Signs and symptoms of listed problems will be absent or manageable.   Presents with a flat affect but brightens upon approach. Pt attended all groups and participated appropriately.  Pt spent the majority of the shift watching TV in the lounge.  Pt stated that she is in a calm mood.  Denies SI.

## 2018-05-07 NOTE — PLAN OF CARE
Problem: Depressive Symptoms  Goal: Depressive Symptoms  Signs and symptoms of listed problems will be absent or manageable.   Outcome: No Change  Pt spent most of the shift watching tv in the lounge.  Pt c/o being irritable today.  She feels she does not do well on Sundays.  Discussed triggers of not being able to use a razor and receiving a phone call where she was accused of hiding out in the hospital to avoid her court situation.  Pt was very upset by this.  She is anxious to start treatment and move forward.  Appears tense and irritable.

## 2018-05-07 NOTE — PROGRESS NOTES
"[facility] Psychiatric Progress Note       Interim History     The patient's care was discussed with the treatment team and chart notes were reviewed. Pt seen on SDU. Tolerating medications without side effects. Side effects, risks, and benefits of medications reviewed with patient. Pt making some progress with medications and more time off Cannabis. No side effects.  Pt       Hospital Course     On May 1, 2018, pt was admitted due to worsening depression with an attempt to hang herself. She was started on a Lamictal titration at 25mg qam. On May 2, 2018, pt reported feeling slightly less depressed with Lamictal. She was encouraged to contact the [facility] in order to defer her upcoming court appearance as she is presently in the hospital.     Medications     Current Facility-Administered Medications Ordered in Epic   Medication Dose Route Frequency Last Rate Last Dose     acetaminophen (TYLENOL) tablet 1,000 mg  1,000 mg Oral Q6H PRN   1,000 mg at 05/05/18 2002     bacitracin-polymyxin b (POLYSPORIN) ointment   Topical BID PRN         hydrOXYzine (ATARAX) tablet 25 mg  25 mg Oral Q4H PRN         lamoTRIgine (LaMICtal) tablet 25 mg  25 mg Oral Daily   25 mg at 05/06/18 0907     mirtazapine (REMERON) tablet 45 mg  45 mg Oral At Bedtime   45 mg at 05/06/18 2137     QUEtiapine (SEROquel XR) 24 hr tablet 300 mg  300 mg Oral At Bedtime   300 mg at 05/06/18 2134     QUEtiapine (SEROquel) tablet 50 mg  50 mg Oral TID PRN         sodium chloride (OCEAN) 0.65 % nasal spray 1-2 spray  1-2 spray Nasal Q1H PRN   2 spray at 05/05/18 2000     varenicline (CHANTIX) tablet 1 mg  1 mg Oral BID   1 mg at 05/06/18 2134     No current [facility]-ordered outpatient prescriptions on file.         Allergies      No Known Allergies     Medical Review of Systems     /73  Pulse 68  Temp 98.8  F (37.1  C) (Oral)  Resp 16  Ht 1.651 m (5' 5\")  Wt 57.2 kg (126 lb 1.6 oz)  SpO2 94%  BMI 20.98 " "kg/m2  Body mass index is 20.98 kg/(m^2).  A 10-point review of systems was performed by Steve Khan MD and is negative, no new findings.      Psychiatric Examination     Appearance Sitting in chair, dressed in casual clothes. Appears stated age. Ligature wound   Attitude Cooperative   Orientation Oriented to person, place, time   Eye Contact Better   Speech Regular rate, rhythm, volume and tone   Language Normal   Psychomotor Behavior Normal   Mood Depressed but a little less   Affect Flat   Thought Process Goal-Oriented, Intact   Associations Intact   Thought Content Patient is currently negative for suicidal ideation, negative for plan or intent, able to contract no self harm and identify barriers to suicide.  Negative for obsessions, compulsions or psychosis.      Fund of Knowledge Average   Insight Improving   Judgement Impaired   Attention Span & Concentration Improving   Recent & Remote Memory Improving   Gait Normal   Muscle Tone Intact        Labs     Labs reviewed.  No results found for this or any previous visit (from the past 24 hour(s)).     Impression     Ms. \"Kippy\" Raheem Jimenez is a 52-year-old  transgender male with a long history of mental health and polysubstance use disorder. Has been sober for a number of years. Relapsed and has been smoking marijuana for the last 4 without telling Dr. Khan. She was admitted to [facility] and was discharged to [place]. She was eventually removed from the outpatient sober housing and attempted to hang herself. Discussed starting pt on Lamictal.  Reviewed the side effects, benefits, and complications of medication. Pt gave verbal agreement to begin Lamictal 25mg qam with intent to titrate. She will remain on the unit for stabilization.         Diagnoses     1. Major depression, recurrent, severe, without psychotic features.  2. Dysthymic disorder.  3. Polysubstance use disorder.     Plan     1. Explained side effects, " benefits, and complications of medications to the patient, Pt gave verbal consent.  2. Medication changes: Continue Lamictal titration.  3. Discussed treatment plan with patient and team.  4. Projected length of stay: Until pt has been stabilized with aftercare in place.      Attestation:   Patient has been seen and evaluated by me, Steve Khan MD.    Patient ID:  Name: Raheem Jimenez  MRN: [id number]  Admission: 4/29/2018   YOB: 1965

## 2018-05-07 NOTE — PLAN OF CARE
Problem: Patient Care Overview  Goal: Team Discussion  Team Plan:   Outcome: Improving  BEHAVIORAL TEAM DISCUSSION    Participants: Dr. Khan, nurses, social workers, psych asst  Progress: Pt is in better spirits, pt acknowledge how much money she spent on her breast and wig, pt states that she is out of insurance.   Continued Stay Criteria/Rationale: Pt needs to enroll into [facility] so that pt can [facility]  Medical/Physical: N/A  Precautions:   Behavioral Orders   Procedures     Code 1 - Restrict to Unit     Routine Programming     As clinically indicated     Status 15     Every 15 minutes.     Suicide precautions     Patients on Suicide Precautions should have a Combination Diet ordered that includes a Diet selection(s) AND a Behavioral Tray selection for Safe Tray - with utensils, or Safe Tray - NO utensils       Plan: Pt will remain on unit, ma will be started if needed.   Rationale for change in precautions or plan: ma will be started if needed, so that pt can go to [facility]

## 2018-05-08 NOTE — PLAN OF CARE
Problem: Depressive Symptoms  Goal: Depressive Symptoms  Signs and symptoms of listed problems will be absent or manageable.   Pt presents with a flat affect but brightens upon approach. Pt attended all groups and participated appropriately.  Pt spent the majority of the shift watching tv in the lounge.  Pt stated that she is feeling more comfortable. Denies SI.

## 2018-05-08 NOTE — PLAN OF CARE
Problem: Depressive Symptoms  Goal: Depressive Symptoms  Signs and symptoms of listed problems will be absent or manageable.   Outcome: Improving  Pt was active and pleasant within the SDU. Pt presents with a full range affect and sad, but calm, mood. Pt spent most of the day in the lounge watching tv. Pt was minimally social and minimally interactive, but is bright upon approach and prompt. Pt attended groups and participated. Pt did control tv for a large portion of the shift and needs to be reminded to give others turns. Pt ate all of her food and was med compliant.

## 2018-05-08 NOTE — PLAN OF CARE
Problem: General Plan of Care (Inpatient Behavioral)  Goal: Discharge Planning  Patient attended Process Group on 5/7 and 5/8/18. Participation complete and appropriate on 5/7- more complete and lively on 5/8/18.

## 2018-05-09 NOTE — PROGRESS NOTES
[facility] Psychiatric Progress Note       Interim History     The patient's care was discussed with the treatment team and chart notes were reviewed. Pt seen on SDU. Tolerating medications without side effects. Side effects, risks, and benefits of medications reviewed with patient. She reports that she has not been working for some time so she likely does not have any insurance, case management to speak with Business Office regarding this. She states that she has been working on the AA Big Book on the unit.      Hospital Course     On May 1, 2018, pt was admitted due to worsening depression with an attempt to hang herself. She was started on a Lamictal titration at 25mg qam. On May 2, 2018, pt reported feeling slightly less depressed with Lamictal. She was encouraged to contact the [facility] in order to defer her upcoming court appearance as she is presently in the hospital. Pt requested being able to use a razor blade to shave, she was informed that no one was allowed to use 2nd to safety concerns for both pt and other patients. Pt was reminded of the serious suicide attempt he made leading to the current hospitalization. (she still has visible raw skin on neck where he attempted to hang herself) Pt wanted to sign out and she was told that she would be put on a 72 hold and commitment filed if she signed a 12 hour intent. On May 9, 2018, pt remains much the same, she is waiting to hear back about insurance to complete application for MA.     Medications     Current Facility-Administered Medications Ordered in Epic   Medication Dose Route Frequency Last Rate Last Dose     acetaminophen (TYLENOL) tablet 1,000 mg  1,000 mg Oral Q6H PRN   1,000 mg at 05/05/18 2002     bacitracin-polymyxin b (POLYSPORIN) ointment   Topical BID PRN         hydrOXYzine (ATARAX) tablet 25 mg  25 mg Oral Q4H PRN         lamoTRIgine (LaMICtal) tablet 25 mg  25 mg Oral Daily   25 mg at 05/09/18 0830      "mirtazapine (REMERON) tablet 45 mg  45 mg Oral At Bedtime   45 mg at 05/08/18 2129     QUEtiapine (SEROquel XR) 24 hr tablet 300 mg  300 mg Oral At Bedtime   300 mg at 05/08/18 2129     QUEtiapine (SEROquel) tablet 50 mg  50 mg Oral TID PRN   50 mg at 05/07/18 0909     sodium chloride (OCEAN) 0.65 % nasal spray 1-2 spray  1-2 spray Nasal Q1H PRN   2 spray at 05/05/18 2000     varenicline (CHANTIX) tablet 1 mg  1 mg Oral BID   1 mg at 05/09/18 0830     No current Epic-ordered outpatient prescriptions on file.         Allergies      No Known Allergies     Medical Review of Systems     /74  Pulse 68  Temp 98  F (36.7  C) (Oral)  Resp 16  Ht 1.651 m (5' 5\")  Wt 57.9 kg (127 lb 9.6 oz)  SpO2 94%  BMI 21.23 kg/m2  Body mass index is 21.23 kg/(m^2).  A 10-point review of systems was performed by Steve Khan MD and is negative, no new findings.      Psychiatric Examination     Appearance Sitting in chair, dressed in casual clothes. Appears stated age. Ligature wound   Attitude Cooperative   Orientation Oriented to person, place, time   Eye Contact Fair   Speech Regular rate, rhythm, volume and tone   Language Normal   Psychomotor Behavior Normal   Mood Slightly less depressed   Affect Flat, downcast   Thought Process Goal-Oriented, Intact   Associations Intact   Thought Content Patient is currently negative for suicidal ideation, negative for plan or intent, able to contract no self harm and identify barriers to suicide.  Negative for obsessions, compulsions or psychosis.      Fund of Knowledge Average   Insight Impaired   Judgement Impaired   Attention Span & Concentration Improving   Recent & Remote Memory Fair   Gait Normal   Muscle Tone Intact        Labs     Labs reviewed.  No results found for this or any previous visit (from the past 24 hour(s)).     Impression     Ms. \"Kippy\" Raheem Jimenez is a 52-year-old  transgender male with a long history of mental health and polysubstance use " disorder. Has been sober for a number of years. Relapsed and has been smoking marijuana for the last 4 without telling Dr. Khan. She was admitted to [facility] and was discharged to [place]. She was eventually removed from the outpatient sober housing and attempted to hang herself. Discussed starting pt on Lamictal.  Reviewed the side effects, benefits, and complications of medication. Pt gave verbal agreement to begin Lamictal 25mg qam with intent to titrate. She will remain on the unit for stabilization.         Diagnoses     1. Major depression, recurrent, severe, without psychotic features.  2. Dysthymic disorder.  3. Polysubstance use disorder.     Plan     1. Explained side effects, benefits, and complications of medications to the patient, Pt gave verbal consent.  2. Medication changes: Continue Lamictal titration.  3. Discussed treatment plan with patient and team.  4. Projected length of stay: Until pt has been stabilized with aftercare in place.  5. Place on 72 hour hold if pt tries to sign out of hospital      Attestation:   Patient has been seen and evaluated by me, Steve Khan MD.    Patient ID:  Name: Raheem Jimenez  MRN: [id number]  Admission: 4/29/2018   YOB: 1965

## 2018-05-09 NOTE — PROGRESS NOTES
Spoke with the business office, Julia, she verified that patient does not have insurance and she will be up today to see Patient.  Patient was notified.

## 2018-05-09 NOTE — PLAN OF CARE
Problem: Depressive Symptoms  Goal: Depressive Symptoms  Signs and symptoms of listed problems will be absent or manageable.   Outcome: No Change  Pt presents with a flat affect but brightens upon approach. Pt's mood is calm but pt reports feeling anxious concerning her upcoming court date as she is worried she may go to [facility]. Pt was informed that her insurance was canceled by her , but pt reports that she was expecting this and has been informed that she will be insured through MA. Pt met with a staff member from the business office. Pt reports feeling hopeful that she will have her rule 25 completed so that she can be D[facility]C to [facility]. Pt reports feeling good about her transition and is anxious to start her hormone treatment. Pt attended groups and spent time in the lounge watching TV.

## 2018-05-09 NOTE — PLAN OF CARE
Problem: Depressive Symptoms  Goal: Depressive Symptoms  Signs and symptoms of listed problems will be absent or manageable.   Outcome: No Change  Patient presents with a flat affect and irritable mood. Pt spent the majority of the shift in the lounge watching TV. Pt had control over the TV for the majority of the shift, and did not allow peers to change the channel. Pt was irritable with staff when TV was turned off to begin groups. Pt attended unit programing and is withdrawn. Pt states that she is unsure of what her next step will be and has requested to meet with a  regarding placement. Pt is med compliant, denies SI and spent the remainder of the shift asleep in her room.

## 2018-05-10 NOTE — PLAN OF CARE
Problem: Patient Care Overview  Goal: Discharge Needs Assessment  Patient attended Process Group and participated appropriately. Patient demonstrated good insight into the topic of discussion. He did touch on the subject of his suicide attempt by hanging during group.  Patient remains flat and depressed in appearance.

## 2018-05-10 NOTE — PLAN OF CARE
Problem: Patient Care Overview  Goal: Discharge Needs Assessment   called and spoke with Julia in the [facility] Business Office today. She stated that she had met with patient yesterday and an application for medical assistance has been submitted for patient. She stated that it will be backdated to 04/01/18 so will cover patient's hospital stay.

## 2018-05-10 NOTE — PLAN OF CARE
Problem: Patient Care Overview  Goal: Discharge Needs Assessment  Patient attended Process Group on Wednesday, 05/09/18, and participated appropriately. Patient was noted to be quite flat and depressed during group. Patient demonstrated adequate insight into the topic of discussion.

## 2018-05-10 NOTE — PLAN OF CARE
Problem: Patient Care Overview  Goal: Team Discussion  Team Plan:   Outcome: No Change  BEHAVIORAL TEAM DISCUSSION    Participants: Dr. Khan, nurses, , psych assistants  Progress: no change  Continued Stay Criteria/Rationale: Needs rule 25 assessment  Medical/Physical:   Precautions:   Behavioral Orders   Procedures     Code 1 - Restrict to Unit     Routine Programming     As clinically indicated     Status 15     Every 15 minutes.     Suicide precautions     Patients on Suicide Precautions should have a Combination Diet ordered that includes a Diet selection(s) AND a Behavioral Tray selection for Safe Tray - with utensils, or Safe Tray - NO utensils       Plan: Medical assistance approved, rule 25 needed  Rationale for change in precautions or plan: medical assistance approved

## 2018-05-10 NOTE — PLAN OF CARE
Problem: Depressive Symptoms  Goal: Depressive Symptoms  Signs and symptoms of listed problems will be absent or manageable.   Outcome: No Change  Pt is present and pleasant. Pt presents a calm mood, pt seems to still be depressed. Pt keeps to herself most of the shift. Pt spends the whole shift down at the lounge. Pt watches tv most of the shift, pt attended process group. Pt was flat and blunt with staff. Pt had multiple requests during the shift, wanted make up, razor, gatorade, etc. Pt does interact with other pt in the lounge when approached. Pt was med compliant.

## 2018-05-10 NOTE — PROGRESS NOTES
[facility] Psychiatric Progress Note       Interim History     The patient's care was discussed with the treatment team and chart notes were reviewed. Pt seen on SDU. Tolerating medications without side effects. Side effects, risks, and benefits of medications reviewed with patient. She reports she is still feeling hopeless and depressed. She states that she was able to obtain MA yesterday and is looking forward to completing a Rule 25 assessment for CD treatment.     Hospital Course     On May 1, 2018, pt was admitted due to worsening depression with an attempt to hang herself. She was started on a Lamictal titration at 25mg qam. On May 2, 2018, pt reported feeling slightly less depressed with Lamictal. She was encouraged to contact the [facility] in order to defer her upcoming court appearance as she is presently in the hospital. Pt requested being able to use a razor blade to shave, she was informed that no one was allowed to use 2nd to safety concerns for both pt and other patients. Pt was reminded of the serious suicide attempt he made leading to the current hospitalization. (she still has visible raw skin on neck where he attempted to hang herself) Pt wanted to sign out and she was told that she would be put on a 72 hold and commitment filed if she signed a 12 hour intent. On May 9, 2018, pt remains much the same, she is waiting to hear back about insurance to complete application for MA. On May 10, 2018, pt reports she continues to feel hopeless and depressed. She will need to have a Rule 25 assessment completed as she now has MA for insurance. On May 10, 2018, pt reported that she now has MA for insurance and is able to complete a Rule 25 assessment for CD treatment.      Medications     Current Facility-Administered Medications Ordered in Epic   Medication Dose Route Frequency Last Rate Last Dose     acetaminophen (TYLENOL) tablet 1,000 mg  1,000 mg Oral Q6H PRN   1,000  "mg at 05/05/18 2002     bacitracin-polymyxin b (POLYSPORIN) ointment   Topical BID PRN         hydrOXYzine (ATARAX) tablet 25 mg  25 mg Oral Q4H PRN         lamoTRIgine (LaMICtal) tablet 25 mg  25 mg Oral Daily   25 mg at 05/10/18 0653     mirtazapine (REMERON) tablet 45 mg  45 mg Oral At Bedtime   45 mg at 05/09/18 2131     QUEtiapine (SEROquel XR) 24 hr tablet 300 mg  300 mg Oral At Bedtime   300 mg at 05/09/18 2131     QUEtiapine (SEROquel) tablet 50 mg  50 mg Oral TID PRN   50 mg at 05/10/18 1120     sodium chloride (OCEAN) 0.65 % nasal spray 1-2 spray  1-2 spray Nasal Q1H PRN   2 spray at 05/10/18 0651     varenicline (CHANTIX) tablet 1 mg  1 mg Oral BID   1 mg at 05/10/18 0653     No current [facility]-ordered outpatient prescriptions on file.         Allergies      No Known Allergies     Medical Review of Systems     /72  Pulse 64  Temp 98  F (36.7  C) (Oral)  Resp 18  Ht 1.651 m (5' 5\")  Wt 57.9 kg (127 lb 9.6 oz)  SpO2 94%  BMI 21.23 kg/m2  Body mass index is 21.23 kg/(m^2).  A 10-point review of systems was performed by Steve Khan MD and is negative, no new findings.      Psychiatric Examination     Appearance Sitting in chair, dressed in casual clothes. Appears stated age. Ligature wound   Attitude Cooperative, withdrawn   Orientation Oriented to person, place, time   Eye Contact Fair   Speech Regular rate, rhythm, volume and tone   Language Normal   Psychomotor Behavior Normal   Mood Depressed   Affect Flat, downcast   Thought Process Goal-Oriented, Intact   Associations Intact   Thought Content Patient is currently negative for suicidal ideation, positive for passive death wish, negative for plan or intent, able to contract no self harm and identify barriers to suicide.  Negative for obsessions, compulsions or psychosis.      Fund of Knowledge Average   Insight Impaired   Judgement Slightly improved   Attention Span & Concentration Intact   Recent & Remote Memory Fair   Gait Normal " "  Muscle Tone Intact        Labs     Labs reviewed.  No results found for this or any previous visit (from the past 24 hour(s)).     Impression     Ms. \"Kippy\" Raheem Jimenez is a 52-year-old  transgender male with a long history of mental health and polysubstance use disorder. Has been sober for a number of years. Relapsed and has been smoking marijuana for the last 4 without telling Dr. Khan. She was admitted to [facility] and was discharged to [place]. She was eventually removed from the outpatient sober housing and attempted to hang herself. Discussed starting pt on Lamictal.  Reviewed the side effects, benefits, and complications of medication. Pt gave verbal agreement to begin Lamictal 25mg qam with intent to titrate. She will remain on the unit for stabilization.         Diagnoses     1. Major depression, recurrent, severe, without psychotic features.  2. Dysthymic disorder.  3. Polysubstance use disorder.     Plan     1. Explained side effects, benefits, and complications of medications to the patient, Pt gave verbal consent.  2. Medication changes: Continue Lamictal titration.  3. Discussed treatment plan with patient and team.  4. Projected length of stay: Until pt has been stabilized with aftercare in place.  5. Place on 72 hour hold if pt tries to sign out of hospital.  6. Rule 25 assessment to be set up for soonest possible time.      Attestation:   Patient has been seen and evaluated by me, Steve Khan MD.    Patient ID:  Name: Raheem Jimenez  MRN: [id number]  Admission: 4/29/2018   YOB: 1965   "

## 2018-05-10 NOTE — PLAN OF CARE
Problem: Depressive Symptoms  Goal: Depressive Symptoms  Signs and symptoms of listed problems will be absent or manageable.   Outcome: No Change  Pt spent all shift in the lounge watching and controlling the tv. She was observed using the remote to change the tv  and hiding it under her sheets on her seat so that others do not have access to it. There have been multiple complaints from other peers. This was addressed with Pt and she became upset when told she had to share the remote and not sony it. Pt only attended wrap up group. She presents with a flat affect and is withdrawn.

## 2018-05-11 NOTE — PLAN OF CARE
"Problem: Depressive Symptoms  Goal: Depressive Symptoms  Signs and symptoms of listed problems will be absent or manageable.   Outcome: No Change  Pt appeared to be tense and irritable at times. Spent the shift in the lounge watching TV, and minimally social with peers. Stated she is \"doing good.\" Is glad MA was approved, and is looking forward to having a rule 25 assessment on Wednesday. Attended wrap up group and was appropriate.       "

## 2018-05-11 NOTE — PROGRESS NOTES
Pt is visible in the SDU and watching TV in the lounge room. A little anxious waiting for CD assessment to go to rule 25 on Wednesday . Tp states her cousin will be P/U or she will use the cab voucher from the . Remains with blunt affect  But with calm mood. Although she is calm she reported a little anxiety of the unknown for his transfers to the new facility. Denies any SI. Attended community meeting and participated minimally. Isolative and rarely socialized with friends.

## 2018-05-11 NOTE — PROGRESS NOTES
[facility] Psychiatric Progress Note       Interim History     The patient's care was discussed with the treatment team and chart notes were reviewed. Pt seen on SDU. Tolerating medications without side effects. Side effects, risks, and benefits of medications reviewed with patient. Pt reports she has an appointment set up for a Rule 25 assessment next week. She is looking forward to completing this next week and moving forward with treatment. She was encouraged to participate in an AA meeting with her peers on the unit.     Hospital Course     On May 1, 2018, pt was admitted due to worsening depression with an attempt to hang herself. She was started on a Lamictal titration at 25mg qam. On May 2, 2018, pt reported feeling slightly less depressed with Lamictal. She was encouraged to contact the [facility] in order to defer her upcoming court appearance as she is presently in the hospital. Pt requested being able to use a razor blade to shave, she was informed that no one was allowed to use 2nd to safety concerns for both pt and other patients. Pt was reminded of the serious suicide attempt he made leading to the current hospitalization. (she still has visible raw skin on neck where he attempted to hang herself) Pt wanted to sign out and she was told that she would be put on a 72 hold and commitment filed if she signed a 12 hour intent. On May 9, 2018, pt remains much the same, she is waiting to hear back about insurance to complete application for MA. On May 10, 2018, pt reports she continues to feel hopeless and depressed. She will need to have a Rule 25 assessment completed as she now has MA for insurance. On May 10, 2018, pt reported that she now has MA for insurance and is able to complete a Rule 25 assessment for CD treatment. On May 11, 2018, pt stated she was able to set up an appointment for a Rule 25 assessment next week, encouraged her to engage in an AA meeting with  "her peers on the unit.     Medications     Current Facility-Administered Medications Ordered in Epic   Medication Dose Route Frequency Last Rate Last Dose     acetaminophen (TYLENOL) tablet 1,000 mg  1,000 mg Oral Q6H PRN   1,000 mg at 05/05/18 2002     bacitracin-polymyxin b (POLYSPORIN) ointment   Topical BID PRN         hydrOXYzine (ATARAX) tablet 25 mg  25 mg Oral Q4H PRN         lamoTRIgine (LaMICtal) tablet 25 mg  25 mg Oral Daily   25 mg at 05/11/18 0813     mirtazapine (REMERON) tablet 45 mg  45 mg Oral At Bedtime   45 mg at 05/10/18 2118     QUEtiapine (SEROquel XR) 24 hr tablet 300 mg  300 mg Oral At Bedtime   300 mg at 05/10/18 2118     QUEtiapine (SEROquel) tablet 50 mg  50 mg Oral TID PRN   50 mg at 05/10/18 1120     sodium chloride (OCEAN) 0.65 % nasal spray 1-2 spray  1-2 spray Nasal Q1H PRN   2 spray at 05/11/18 0812     varenicline (CHANTIX) tablet 1 mg  1 mg Oral BID   1 mg at 05/11/18 0813     No current [facility]-ordered outpatient prescriptions on file.         Allergies      No Known Allergies     Medical Review of Systems     /69  Pulse 64  Temp 98.2  F (36.8  C) (Oral)  Resp 16  Ht 1.651 m (5' 5\")  Wt 57.9 kg (127 lb 9.6 oz)  SpO2 94%  BMI 21.23 kg/m2  Body mass index is 21.23 kg/(m^2).  A 10-point review of systems was performed by Steve Khan MD and is negative, no new findings.      Psychiatric Examination     Appearance Sitting in chair, dressed in casual clothes. Appears stated age. Ligature wound   Attitude Cooperative, withdrawn   Orientation Oriented to person, place, time   Eye Contact Fair   Speech Regular rate, rhythm, volume and tone   Language Normal   Psychomotor Behavior Normal   Mood Depressed   Affect Flat, downcast   Thought Process Goal-Oriented, Intact   Associations Intact   Thought Content Patient is currently negative for suicidal ideation, positive for passive death wish, negative for plan or intent, able to contract no self harm and identify barriers " "to suicide.  Negative for obsessions, compulsions or psychosis.      Fund of Knowledge Average   Insight Slightly improved   Judgement Slightly improved   Attention Span & Concentration Intact   Recent & Remote Memory Fair   Gait Normal   Muscle Tone Intact        Labs     Labs reviewed.  No results found for this or any previous visit (from the past 24 hour(s)).     Impression     Ms. \"Kippy\" Raheem Jimenez is a 52-year-old  transgender male with a long history of mental health and polysubstance use disorder. Has been sober for a number of years. Relapsed and has been smoking marijuana for the last 4 without telling Dr. Khan. She was admitted to [facility] and was discharged to [place]. She was eventually removed from the outpatient sober housing and attempted to hang herself. Discussed starting pt on Lamictal.  Reviewed the side effects, benefits, and complications of medication. Pt gave verbal agreement to begin Lamictal 25mg qam with intent to titrate. She will remain on the unit for stabilization.         Diagnoses     1. Major depression, recurrent, severe, without psychotic features.  2. Dysthymic disorder.  3. Polysubstance use disorder.     Plan     1. Explained side effects, benefits, and complications of medications to the patient, Pt gave verbal consent.  2. Medication changes: Continue Lamictal titration.  3. Discussed treatment plan with patient and team.  4. Projected length of stay: Until pt has been stabilized with aftercare in place.  5. Place on 72 hour hold if pt tries to sign out of hospital.  6. Rule 25 assessment to be set up for soonest possible time.      Attestation:   Patient has been seen and evaluated by me, Steve Khan MD.    Patient ID:  Name: Raheem Jimenez  MRN: [id number]  Admission: 4/29/2018   YOB: 1965   "

## 2018-05-11 NOTE — PROGRESS NOTES
Pt left the SDU lounge , speaking loudly - was upset regarding the TV and another pt wanting to watch a reality show. Pt entered her room and slammed the door. Pt came out a couple minutes later with 2 bags of belongings and stated she wants to leave. Dr Khan was phoned -  ordered for pt to be placed on a 72 hr hold if pt demands to leave. Dr Khan stated would see pt again tomorrow. During 1:1, pt stated she would stay at her mom's if she left here, stated she doesn't want to stay here for weeks waiting for treatment. Pt later stated cannot stay at mom's due to a restraining order, and verbally vented about lies being told by wife. Staff reinforced for pt that some good things are occurring - approval for MA and Rule 25 appt coming up May 16. Pt returned her items to her room and later returned to [facility] to watch TV.

## 2018-05-12 NOTE — PLAN OF CARE
"Problem: Depressive Symptoms  Goal: Depressive Symptoms  Signs and symptoms of listed problems will be absent or manageable.   Outcome: No Change  Pt tense and irritable early in shift. Apparently has been using plastic thermometer probes to chew on, when told by staff that would not be allowed anymore, she got upset and stated that \"I having been doing it everyday since I've been here and nobody has said anything to me\". Was visible all shift either out in lounge watching TV or in hallway on telephone. Minimally social with peers. Med compliant      "
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
## 2018-05-13 NOTE — PLAN OF CARE
Problem: Depressive Symptoms  Goal: Depressive Symptoms  Signs and symptoms of listed problems will be absent or manageable.   Outcome: No Change  Pt presents with a flat affect and a calm mood during the shift. Pt did not attend groups and spent the majority of the shift watching tv in the lounge and controlling the remote. Pt is suspected of truing up the heat in [facility] numerous times.  Pt was not social with peers, but her affect brightened during 1:1 while discussing her upcoming rule 25 on [place] and hopefully being admitted to [place]. Pt denied most anxiety, but stated that she is having some regarding getting a ride to her rule 25.

## 2018-05-13 NOTE — PLAN OF CARE
Problem: Depressive Symptoms  Goal: Depressive Symptoms  Signs and symptoms of listed problems will be absent or manageable.   Outcome: No Change  Pt was present on the unit but not social with peers. Pt was suspected that she kept turning up the temperature in the lounge area. Pt was guarded with staff in conversation. Pt did request or keep the temperature covering. Pt appeared tired, depressed, and tense. Pt presents with a flat affect.

## 2018-05-14 NOTE — PLAN OF CARE
Problem: Depressive Symptoms  Goal: Depressive Symptoms  Signs and symptoms of listed problems will be absent or manageable.   Outcome: No Change  Patient presents with a full range affect and calm mood. Pt is visible on the unit, socializing with staff and peers. Pt spent time visiting with her mother and cousin. Pt states that when they arrived she changed into a sweatshirt and took her wig off. Pt's visitors encouraged pt to feel free to be herself. Pt changed into women's clothing and put her wig back on. Pt spent more time visiting with her family and states that she is happy that her mother and cousin where so accepting of her transformation. Pt attended unit programing and participated approprietly. Pt states that she had a wonderful day and feels as though a weight has been lifted as her mother and cousin where so accepting of her transition. Pt spent time watching tv and was respectful to peers asking them if they wanted to decide what programing to watch.

## 2018-05-14 NOTE — PLAN OF CARE
Problem: Patient Care Overview  Goal: Team Discussion  Team Plan:   Outcome: Improving  BEHAVIORAL TEAM DISCUSSION    Participants: Dr. Khan, , Nursing staff and PA's  Progress: Improving  Continued Stay Criteria/Rationale: Continue Stabilization.  Medical/Physical: N/A  Precautions:   Behavioral Orders   Procedures     Code 1 - Restrict to Unit     Routine Programming     As clinically indicated     Status 15     Every 15 minutes.     Plan: Patient was given medication information and gave verbal consent. Plan of care was discussed with patient and team. Patient has a rule 25 set up on Wednesday and will be picked up around 11-11:30am. Pt will be placed on hold if she demands to leave.     Rationale for change in precautions or plan: N/A
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
## 2018-05-14 NOTE — PLAN OF CARE
Problem: Depressive Symptoms  Goal: Depressive Symptoms  Signs and symptoms of listed problems will be absent or manageable.   Outcome: Improving  Full range affect, mood calm, pleasant with staff and more social with peers. Visible on the unit, watching TV. During 1:1, she stated that she feels so much better now that her mother knows about her transition. Looking forward to going to Rule 25 on Wednesday from 12-2 pm. Will need pass and ride will pick her up between 11:00 and 11:30 am

## 2018-05-15 NOTE — PLAN OF CARE
Problem: Depressive Symptoms  Goal: Depressive Symptoms  Signs and symptoms of listed problems will be absent or manageable.   Outcome: Improving  Pt presents with a full range affect and a calm mood. Pt was pleasant and somewhat social on the unit. Pt spent time watching TV in the lounge and attended groups. Pt does not report depression or anxiety as she is feeling positive about how her mother has handled her transition news. Pt states that she is going to work on not being irritable when she is feeling anxious. Pt is looking forward to her rule 25 on [place] and having her cousin pick her up.

## 2018-05-15 NOTE — PLAN OF CARE
Problem: Depressive Symptoms  Goal: Depressive Symptoms  Signs and symptoms of listed problems will be absent or manageable.   Outcome: No Change  Patient presents with a full range affect and calm mood. Pt is visible on the unit seen socializing with staff and peers. Pt spent the majority of the shift in the lounge watching TV. Pt states she feels her mood has improved due to positive visit with mother and cousin. Pt plans to leave the unit on wednesday 5/16/2018 on a pass to complete a rule 25. Pt will be picked up by her cousin and pt states she looks forward to seeing him again. Pt attended unit programing and participated approprietly.
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
## 2018-05-16 NOTE — PLAN OF CARE
Problem: Depressive Symptoms  Goal: Depressive Symptoms  Signs and symptoms of listed problems will be absent or manageable.   Outcome: No Change  Patient presents with full-range affect, calm mood. Somewhat tense at times but generally pleasant and cooperative with peers and staff. Present but not very social. Spent first part of shift watching TV in lounge or participating in groups. Left on a pass to Rule 25 at 1100 with her cousin. Will return around 1600 or 1700.

## 2018-05-16 NOTE — PLAN OF CARE
Problem: Depressive Symptoms  Goal: Depressive Symptoms  Signs and symptoms of listed problems will be absent or manageable.   Outcome: Improving  Pleasant, cooperative, and social. Spent time watching TV and attended wrap up group.  Reports having a good day.  She is looking forward to her Rule 25 tomorrow. Appears calm.

## 2018-05-17 NOTE — PLAN OF CARE
Problem: Depressive Symptoms  Goal: Depressive Symptoms  Signs and symptoms of listed problems will be absent or manageable.   Outcome: Improving  Irritable this morning, mood improved this afternoon. Cooperative and med compliant. Audrey ANN. Participated in process group. Pt planning to discharge to [place].

## 2018-05-17 NOTE — PLAN OF CARE
"Problem: Depressive Symptoms  Goal: Depressive Symptoms  Signs and symptoms of listed problems will be absent or manageable.   Outcome: No Change  Pt was escalating over television programing, yelling and disruptive to the milieu.  Apparently, multiple staff spoke with pt regarding television use.  One staff member directed the pt to her room to calm down and she flipped off the staff member off and yelled profanities.  She calmed down some but came up to the nurses station yelling again. Spoke with pt in her room and she continued to rant and yell, \"It's majority rules.\" Pt says that she was upset over double standards, feels that the rules are being changed on the unit, and that it is not fair.  Pt claims to be upset over having to watch a game to pass the time.  Suggested participating in art therapy as an alternative, which she later did.  Given PRN Seroquel.  Pt was able to calm down.  Pt reports that the Rule 25 went well and she is looking forward to going to [facility].      "

## 2018-05-17 NOTE — PLAN OF CARE
Problem: Patient Care Overview  Goal: Team Discussion  Team Plan:   Outcome: Improving  BEHAVIORAL TEAM DISCUSSION    Participants: Dr. Khan, , Nursing staff and PA's  Progress: Improving  Continued Stay Criteria/Rationale: Until Stabilized with aftercare in place  Medical/Physical: N/A  Precautions:   Behavioral Orders   Procedures     Code 1 - Restrict to Unit     Routine Programming     As clinically indicated     Status 15     Every 15 minutes.     Plan: Patient was given medication information and gave verbal consent. Plan of care was discussed with patient and team. Pt will discharge to [place] in 1-2 weeks. Continue hospitalization.  Rationale for change in precautions or plan: N/A
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
## 2018-05-17 NOTE — PROGRESS NOTES
05/16/18 2100   Art Therapy   Type of Intervention structured groups   Response participates with encouragement   Hours 0.5   Treatment Detail (suicide attempt/ CD issues)   Pt came in for the last 30 minutes of group. She finished a necklace she had made yesterday. She was quiet and focused. In wrap up group she said she had a very good day with a day pass and accomplishing the rule 25.
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
## 2018-05-18 NOTE — PLAN OF CARE
Problem: Depressive Symptoms  Goal: Depressive Symptoms  Signs and symptoms of listed problems will be absent or manageable.   Outcome: No Change  Pt has been present and pleasant in the SDU throughout the day shift. Respectful to peers and staff. Presents a flat affect and remains calm and collected. Pt ate all of his meals for breakfast and lunch. Has been in the lounge throughout the shift watching TV and minimally conversing with peers.

## 2018-05-18 NOTE — PROGRESS NOTES
"Participated in Music Therapy group focused on social and emotional skill building through music listening and response/reflection.  Engaged and cooperative. Music appears to be (and is endorsed by \"Kippy\" as being) highly motivational and expressive for Kippy.       "
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
## 2018-05-19 NOTE — PLAN OF CARE
Problem: Depressive Symptoms  Goal: Depressive Symptoms  Signs and symptoms of listed problems will be absent or manageable.   Outcome: Improving  Pleasant and cooperative.  Pt really enjoyed music therapy today.  She is not looking forward to being here through the weekend since it is a slower pace but hopes her mom will visit.  Expressed concern with recent weight gain and wonders if it is a medication side effect.  Pt acknowledges that her mood has improved with meds though. Encouraged increased exercise.  Brightens during interaction and appears stable in mood.

## 2018-05-19 NOTE — PLAN OF CARE
Problem: Depressive Symptoms  Goal: Depressive Symptoms  Signs and symptoms of listed problems will be absent or manageable.   Outcome: Improving  Pt was pleasant and cooperative. Calm in mood. Spent time watching TV in the lounge. Attended community meeting and participated appropriately. Looking forward to discharging to treatment at [place], waiting for a bed.

## 2018-05-20 NOTE — PLAN OF CARE
Problem: Depressive Symptoms  Goal: Depressive Symptoms  Signs and symptoms of listed problems will be absent or manageable.   Pt has been observed asking other pts' what they want to watch on TV. More cooperative with sharing the TV. She spent most of the shift watching movies and socializing. Pt's affect appears to be brighter.

## 2018-05-20 NOTE — PLAN OF CARE
Problem: Depressive Symptoms  Goal: Depressive Symptoms  Signs and symptoms of listed problems will be absent or manageable.   Outcome: No Change   Patient reports no questions or concerns, just waiting for a bed at treatment. She attended two of the AM groups with proper participation. Pleasant and cooperative with staff.

## 2018-05-21 NOTE — PLAN OF CARE
Problem: General Plan of Care (Inpatient Behavioral)  Goal: Discharge Planning  Attended process group. Discussed plan to go to treatment hitting a snag. Patient is contemplating leaving the hospital to get an apartment and job, go to an outpatient treatment program and to therapy program for transgender. Acknoledges hazard od staying off drugs and habit of self-sabotage.

## 2018-05-21 NOTE — PLAN OF CARE
Problem: Depressive Symptoms  Goal: Depressive Symptoms  Signs and symptoms of listed problems will be absent or manageable.   Outcome: No Change  Pt was more irritable this shift.  She is frustrated with a peer over television programming.  She is also frustrated with still being here.  States that she is ready to [facility] to [facility] and move on.  Feels that IP hospitalization is not doing any good anymore and that it is making things worse.  Also says that she will try to be patient with the process.

## 2018-05-21 NOTE — PROGRESS NOTES
Iv attempt x 2 unsuccessful, Fabian Toth will try Patient asked if there was any information on an admission date at [place]. Told her I had just been handed a phone note from [facility] requesting a call. Call placed to [facility] in admissions (). Message left acknowledging call and requesting call back to discuss admission.

## 2018-05-21 NOTE — PROGRESS NOTES
[facility] Psychiatric Progress Note       Interim History     The patient's care was discussed with the treatment team and chart notes were reviewed. Pt seen on SDU. Tolerating medications without side effects. Side effects, risks, and benefits of medications reviewed with patient. Denies suicidal or homicidal ideation. Pt had been reportedly irritable and bored on the unit awaiting admission for treatment. She reports she is hard of hearing and would like to receive hearing aids. She was contacted by [facility] yesterday and they reported that they will let her know later this week about their bed status.      Hospital Course     On May 1, 2018, pt was admitted due to worsening depression with an attempt to hang herself. She was started on a Lamictal titration at 25mg qam. On May 2, 2018, pt reported feeling slightly less depressed with Lamictal. She was encouraged to contact the [facility] in order to defer her upcoming court appearance as she is presently in the hospital. Pt requested being able to use a razor blade to shave, she was informed that no one was allowed to use 2nd to safety concerns for both pt and other patients. Pt was reminded of the serious suicide attempt he made leading to the current hospitalization. (she still has visible raw skin on neck where he attempted to hang herself) Pt wanted to sign out and she was told that she would be put on a 72 hold and commitment filed if she signed a 12 hour intent. On May 9, 2018, pt remains much the same, she is waiting to hear back about insurance to complete application for MA. On May 10, 2018, pt reports she continues to feel hopeless and depressed. She will need to have a Rule 25 assessment completed as she now has MA for insurance. On May 10, 2018, pt reported that she now has MA for insurance and is able to complete a Rule 25 assessment for CD treatment. On May 11, 2018, pt stated she was able to set up an appointment  for a Rule 25 assessment next week, encouraged her to engage in an AA meeting with her peers on the unit. On May 14, 2018, pt reported she had a good meeting with her mother and cousin. She is waiting for her Rule 25 assessment on Wednesday. On May 15, 2018, pt reported feeling less depressed this morning. Will increase Lamictal to 50mg qam. Rule 25 assessment scheduled for tomorrow at 1100. On May 16, 2018, pt went on a pass to have her Rule 25 assessment for CD treatment. On May 17, 2018, pt reported that she completed her assessment at [place] and will be waiting 1-2 weeks for admission. She is looking forward to treatment. On May 18, 2018, pt is still waiting admission at [place]. She is pleasant and cooperative on the unit. On May 21, 2018, pt stated that she had been irritable and bored on the unit. She was contacted by [facility] and is waiting to hear back regarding her bed status.     Medications     Current Facility-Administered Medications Ordered in Epic   Medication Dose Route Frequency Last Rate Last Dose     acetaminophen (TYLENOL) tablet 1,000 mg  1,000 mg Oral Q6H PRN   1,000 mg at 05/05/18 2002     bacitracin-polymyxin b (POLYSPORIN) ointment   Topical BID PRN         hydrOXYzine (ATARAX) tablet 25 mg  25 mg Oral Q4H PRN   25 mg at 05/11/18 1817     lamoTRIgine (LaMICtal) tablet 50 mg  50 mg Oral Daily   50 mg at 05/21/18 0741     loratadine (CLARITIN) tablet 10 mg  10 mg Oral At Bedtime   10 mg at 05/20/18 1859     mirtazapine (REMERON) tablet 45 mg  45 mg Oral At Bedtime   45 mg at 05/20/18 1859     QUEtiapine (SEROquel XR) 24 hr tablet 300 mg  300 mg Oral At Bedtime   300 mg at 05/20/18 1900     QUEtiapine (SEROquel) tablet 50 mg  50 mg Oral TID PRN   50 mg at 05/16/18 1920     sodium chloride (OCEAN) 0.65 % nasal spray 1-2 spray  1-2 spray Nasal Q1H PRN   2 spray at 05/19/18 1859     varenicline (CHANTIX) tablet 1 mg  1 mg Oral BID   1 mg at 05/21/18 0741     No current [facility]-ordered outpatient  "prescriptions on file.         Allergies      No Known Allergies     Medical Review of Systems     /62  Pulse 81  Temp 98.5  F (36.9  C)  Resp 16  Ht 1.651 m (5' 5\")  Wt 58.8 kg (129 lb 11.2 oz)  SpO2 94%  BMI 21.58 kg/m2  Body mass index is 21.58 kg/(m^2).  A 10-point review of systems was performed by Steve Khan MD and is negative, no new findings.      Psychiatric Examination     Appearance Sitting in chair, dressed in casual clothes. Appears stated age. Ligature wound   Attitude Cooperative, irritable   Orientation Oriented to person, place, time   Eye Contact Fair   Speech Regular rate, rhythm, volume and tone   Language Normal   Psychomotor Behavior Normal   Mood Less depressed, irritable   Affect Flat   Thought Process Goal-Oriented, Intact   Associations Intact   Thought Content Patient is currently negative for suicidal ideation, positive for passive death wish, negative for plan or intent, able to contract no self harm and identify barriers to suicide.  Negative for obsessions, compulsions or psychosis.      Fund of Knowledge Average   Insight Slightly improved   Judgement Improving   Attention Span & Concentration Intact   Recent & Remote Memory Fair   Gait Normal   Muscle Tone Intact        Labs     Labs reviewed.  No results found for this or any previous visit (from the past 24 hour(s)).     Impression     Ms. \"Kippy\" Raheem Jimenez is a 52-year-old  transgender male with a long history of mental health and polysubstance use disorder. Has been sober for a number of years. Relapsed and has been smoking marijuana for the last 4 without telling Dr. Khan. She was admitted to [facility] and was discharged to [place]. She was eventually removed from the outpatient sober housing and attempted to hang herself. Discussed starting pt on Lamictal.  Reviewed the side effects, benefits, and complications of medication. Pt gave verbal agreement to begin Lamictal 25mg " qam with intent to titrate. She will remain on the unit for stabilization.         Diagnoses     1. Major depression, recurrent, severe, without psychotic features.  2. Dysthymic disorder.  3. Polysubstance use disorder.     Plan     1. Explained side effects, benefits, and complications of medications to the patient, Pt gave verbal consent.  2. Medication changes: Continue Lamictal titration, increase to 50mg qam.  3. Discussed treatment plan with patient and team.  4. Projected length of stay: Until pt has been stabilized with aftercare in place, waiting for admission to [place].  5. Place on 72 hour hold if pt tries to sign out of hospital.        Attestation:   Patient has been seen and evaluated by me, Steve Khan MD.    Patient ID:  Name: Raheem Jimenez  MRN: [id number]  Admission: 4/29/2018   YOB: 1965

## 2018-05-21 NOTE — PROGRESS NOTES
Received call from Cecilia at [facility]. She was told by rule 25  that patient has active Medicare and is not eligible for [facility] funding, thus, she cannot come to [facility] and will need to go to a Medicare CD program. She looked patient up on the state site, which shows Medicare active since 1995.     Went to patient with this information. 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 Expressed distress at not being able to go to [facility] and that maybe should just get an apartment and job and focus on transgender work. She will discuss with Dr. Khan tomorrow. Gave patient number to call Social Security to address .

## 2018-05-21 NOTE — PLAN OF CARE
Problem: Depressive Symptoms  Goal: Depressive Symptoms  Signs and symptoms of listed problems will be absent or manageable.   Outcome: No Change  Pt presents with a blunt affect and a calm mood. Pt spent the majority of the shift watching TV, but as staff came up with a  chart where pts can sign into slots, the TV was not an issue. Pt is still awaiting placement. Pt attended groups and participated accordingly.

## 2018-05-21 NOTE — PLAN OF CARE
Problem: Patient Care Overview  Goal: Team Discussion  Team Plan:   Outcome: No Change  BEHAVIORAL TEAM DISCUSSION    Participants: Dr. Khan, nurses, , psych asst  Progress: Pt continues to be sassy, pt gets irritable over tv. Pt is awaiting a treatment center. Pt should not just be watching tv  Continued Stay Criteria/Rationale: Dr want pt to be journaling or doing other therapeutic activities besides watching tv  Medical/Physical: N/A  Precautions:   Behavioral Orders   Procedures     Code 1 - Restrict to Unit     Routine Programming     As clinically indicated     Status 15     Every 15 minutes.     Plan: Pt is awaiting treatment center placement, Pt should be journal-ing or something else rather than watching    Rationale for change in precautions or plan: Pt should not be fighting over the tv, the dr want her to be doing therapeutic activities like writing in a journal

## 2018-05-22 NOTE — PLAN OF CARE
Problem: General Plan of Care (Inpatient Behavioral)  Goal: Discharge Planning  Attended process group on 5/22/18. Participation complete and appropriate.

## 2018-05-22 NOTE — PLAN OF CARE
Problem: Depressive Symptoms  Goal: Depressive Symptoms  Signs and symptoms of listed problems will be absent or manageable.   Outcome: No Change  Patient presents with blunt affect and calm mood. Generally pleasant with peers and staff. Spent most of shift watching movies in the lounge. No conflict with peers regarding the TV this shift. Spent some time making phone calls trying to sort out her Medicare situation. Was able to make some progress but will need to make more phone calls tomorrow morning. Stated the situation is frustrating and she wishes she could leave here faster, but she's trying to remain patient and positive. Attended wrap-up group and participated appropriately. Med-compliant.

## 2018-05-22 NOTE — PROGRESS NOTES
[facility] Psychiatric Progress Note       Interim History     The patient's care was discussed with the treatment team and chart notes were reviewed. Pt seen on SDU. Tolerating medications without side effects. Side effects, risks, and benefits of medications reviewed with patient. Denies suicidal or homicidal ideation. Pt is upset as she found out that [facility] will not accept her due to having Medicare since 1995 due to SSDI. She has not has had benefits since 2007 and has not received income for over a decade as she has been working. She is attempting to have this rectified and would like to be placed on the waiting list for [facility].     Hospital Course     On May 1, 2018, pt was admitted due to worsening depression with an attempt to hang herself. She was started on a Lamictal titration at 25mg qam. On May 2, 2018, pt reported feeling slightly less depressed with Lamictal. She was encouraged to contact the [facility] in order to defer her upcoming court appearance as she is presently in the hospital. Pt requested being able to use a razor blade to shave, she was informed that no one was allowed to use 2nd to safety concerns for both pt and other patients. Pt was reminded of the serious suicide attempt he made leading to the current hospitalization. (she still has visible raw skin on neck where he attempted to hang herself) Pt wanted to sign out and she was told that she would be put on a 72 hold and commitment filed if she signed a 12 hour intent. On May 9, 2018, pt remains much the same, she is waiting to hear back about insurance to complete application for MA. On May 10, 2018, pt reports she continues to feel hopeless and depressed. She will need to have a Rule 25 assessment completed as she now has MA for insurance. On May 10, 2018, pt reported that she now has MA for insurance and is able to complete a Rule 25 assessment for CD treatment. On May 11, 2018, pt stated she was  able to set up an appointment for a Rule 25 assessment next week, encouraged her to engage in an AA meeting with her peers on the unit. On May 14, 2018, pt reported she had a good meeting with her mother and cousin. She is waiting for her Rule 25 assessment on Wednesday. On May 15, 2018, pt reported feeling less depressed this morning. Will increase Lamictal to 50mg qam. Rule 25 assessment scheduled for tomorrow at 1100. On May 16, 2018, pt went on a pass to have her Rule 25 assessment for CD treatment. On May 17, 2018, pt reported that she completed her assessment at [place] [facility] and will be waiting 1-2 weeks for admission. She is looking forward to treatment. On May 18, 2018, pt is still waiting admission at [facility]. She is pleasant and cooperative on the unit. On May 21, 2018, pt stated that she had been irritable and bored on the unit. She was contacted by [facility] and is waiting to hear back regarding her bed status. On  May 22, 2018, pt was determined to have Medicare due to SSDI, however she had not received payments since 2007. She is attempting to work this out with [employer] and Social Security.     Medications     Current Facility-Administered Medications Ordered in Epic   Medication Dose Route Frequency Last Rate Last Dose     acetaminophen (TYLENOL) tablet 1,000 mg  1,000 mg Oral Q6H PRN   1,000 mg at 05/05/18 2002     bacitracin-polymyxin b (POLYSPORIN) ointment   Topical BID PRN         hydrOXYzine (ATARAX) tablet 25 mg  25 mg Oral Q4H PRN   25 mg at 05/11/18 1817     lamoTRIgine (LaMICtal) tablet 50 mg  50 mg Oral Daily   50 mg at 05/22/18 0703     loratadine (CLARITIN) tablet 10 mg  10 mg Oral At Bedtime   10 mg at 05/21/18 2105     mirtazapine (REMERON) tablet 45 mg  45 mg Oral At Bedtime   45 mg at 05/21/18 2105     QUEtiapine (SEROquel XR) 24 hr tablet 300 mg  300 mg Oral At Bedtime   300 mg at 05/21/18 2105     QUEtiapine (SEROquel) tablet 50 mg  50 mg Oral TID PRN   50 mg at 05/16/18 1920     sodium chloride  "(OCEAN) 0.65 % nasal spray 1-2 spray  1-2 spray Nasal Q1H PRN   2 spray at 05/22/18 0708     varenicline (CHANTIX) tablet 1 mg  1 mg Oral BID   1 mg at 05/22/18 0703     No current [facility]-ordered outpatient prescriptions on file.         Allergies      No Known Allergies     Medical Review of Systems     /68  Pulse 79  Temp 97.9  F (36.6  C) (Oral)  Resp 16  Ht 1.651 m (5' 5\")  Wt 60 kg (132 lb 3.2 oz)  SpO2 94%  BMI 22 kg/m2  Body mass index is 22 kg/(m^2).  A 10-point review of systems was performed by Steve Khan MD and is negative, no new findings.      Psychiatric Examination     Appearance Sitting in chair, dressed in casual clothes. Appears stated age. Ligature wound   Attitude Cooperative, irritable   Orientation Oriented to person, place, time   Eye Contact Fair   Speech Regular rate, rhythm, volume and tone   Language Normal   Psychomotor Behavior Normal   Mood Remains somewhat irritable   Affect Flat   Thought Process Goal-Oriented, Intact   Associations Intact   Thought Content Patient is currently negative for suicidal ideation, positive for passive death wish, negative for plan or intent, able to contract no self harm and identify barriers to suicide.  Negative for obsessions, compulsions or psychosis.      Fund of Knowledge Average   Insight Slightly improved   Judgement Improving   Attention Span & Concentration Intact   Recent & Remote Memory Fair   Gait Normal   Muscle Tone Intact        Labs     Labs reviewed.  No results found for this or any previous visit (from the past 24 hour(s)).     Impression     Ms. \"Kippy\" Raheem Jimenez is a 52-year-old  transgender male with a long history of mental health and polysubstance use disorder. Has been sober for a number of years. Relapsed and has been smoking marijuana for the last 4 without telling Dr. Khan. She was admitted to [facility] and was discharged to [place]. She was eventually removed from the " outpatient sober housing and attempted to hang herself. Discussed starting pt on Lamictal.  Reviewed the side effects, benefits, and complications of medication. Pt gave verbal agreement to begin Lamictal 25mg qam with intent to titrate. She will remain on the unit for stabilization.         Diagnoses     1. Major depression, recurrent, severe, without psychotic features.  2. Dysthymic disorder.  3. Polysubstance use disorder.     Plan     1. Explained side effects, benefits, and complications of medications to the patient, Pt gave verbal consent.  2. Medication changes: Continue Lamictal titration, increase to 50mg qam.  3. Discussed treatment plan with patient and team.  4. Projected length of stay: Until pt has been stabilized with aftercare in place, waiting for admission to [place].  5. Place on 72 hour hold if pt tries to sign out of hospital.        Attestation:   Patient has been seen and evaluated by me, Steve Khan MD.    Patient ID:  Name: Raheem Jimenez  MRN: [id number]  Admission: 4/29/2018   YOB: 1965

## 2018-05-22 NOTE — PLAN OF CARE
Problem: Depressive Symptoms  Goal: Depressive Symptoms  Signs and symptoms of listed problems will be absent or manageable.   Outcome: No Change  Pt appeared anxious and preoccupied on the SDU during AM shift. Pt attended gropus and particiapted appropriatly. Pt stated that she is have trouble getting her insurrance figured out to go to [facility], but there is still a problem with  prior medicare funding that has not been updated. Pt spent much of the day on the phone working on this issue. Pt states that she is working hard trying to stay calm and positive. She is still having some problems with another pt concerning the TV but it is not as bad as it was previously.

## 2018-05-22 NOTE — PROGRESS NOTES
Patient reported she has been busy making calls.She called Medicare and confirmed that is no longer active. However, they told her MA had to clear it from the state web site. MA will need to call Medicare to confirm. Eliana is working on that. She has also talked to Jaquelin , who did her rule 25. Pinky will amend it and send to [facility] when can verify Medicare is off the website.

## 2018-05-23 NOTE — PLAN OF CARE
Problem: General Plan of Care (Inpatient Behavioral)  Goal: Discharge Planning  Attended and participated in process group on 5/23.

## 2018-05-23 NOTE — PLAN OF CARE
Problem: Depressive Symptoms  Goal: Depressive Symptoms  Signs and symptoms of listed problems will be absent or manageable.   Outcome: No Change  Pt is present and pleasant in the lounge. Pt presented a full range affect throughout the shift. Pt spent most of the shift in the lounge watching tv and socializing with peers. Pt went to group and provided good insight to the group, pt said that he took a lot away from the group. Pt ate all of his meals, pt did not shower this shift. Pt did receive Rule 25 paper work and sign what she was supposed to and then faxed them in. Pt was happy about it. Pt was med compliant.

## 2018-05-23 NOTE — PROGRESS NOTES
Patient received documents to sign and fax back from [facility]. Patient completed and documents faxed to [facility].

## 2018-05-23 NOTE — PROGRESS NOTES
[facility] Psychiatric Progress Note       Interim History     The patient's care was discussed with the treatment team and chart notes were reviewed. Pt seen on SDU. Tolerating medications without side effects. Side effects, risks, and benefits of medications reviewed with patient. Denies suicidal or homicidal ideation. Pt states she was able to remove herself off of the Medicare registry and she will need to complete paperwork at [facility] to set up her MA insurance. She is looking forward to admission to CD treatment.     Hospital Course     On May 1, 2018, pt was admitted due to worsening depression with an attempt to hang herself. She was started on a Lamictal titration at 25mg qam. On May 2, 2018, pt reported feeling slightly less depressed with Lamictal. She was encouraged to contact the [facility] in order to defer her upcoming court appearance as she is presently in the hospital. Pt requested being able to use a razor blade to shave, she was informed that no one was allowed to use 2nd to safety concerns for both pt and other patients. Pt was reminded of the serious suicide attempt he made leading to the current hospitalization. (she still has visible raw skin on neck where he attempted to hang herself) Pt wanted to sign out and she was told that she would be put on a 72 hold and commitment filed if she signed a 12 hour intent. On May 9, 2018, pt remains much the same, she is waiting to hear back about insurance to complete application for MA. On May 10, 2018, pt reports she continues to feel hopeless and depressed. She will need to have a Rule 25 assessment completed as she now has MA for insurance. On May 10, 2018, pt reported that she now has MA for insurance and is able to complete a Rule 25 assessment for CD treatment. On May 11, 2018, pt stated she was able to set up an appointment for a Rule 25 assessment next week, encouraged her to engage in an AA meeting  with her peers on the unit. On May 14, 2018, pt reported she had a good meeting with her mother and cousin. She is waiting for her Rule 25 assessment on Wednesday. On May 15, 2018, pt reported feeling less depressed this morning. Will increase Lamictal to 50mg qam. Rule 25 assessment scheduled for tomorrow at 1100. On May 16, 2018, pt went on a pass to have her Rule 25 assessment for CD treatment. On May 17, 2018, pt reported that she completed her assessment at [place] and will be waiting 1-2 weeks for admission. She is looking forward to treatment. On May 18, 2018, pt is still waiting admission at [place]. She is pleasant and cooperative on the unit. On May 21, 2018, pt stated that she had been irritable and bored on the unit. She was contacted by [facility] and is waiting to hear back regarding her bed status. On  May 22, 2018, pt was determined to have Medicare due to SSDI, however she had not received payments since 2007. She is attempting to work this out with [employer] and Social Security. On May 23, 2018, pt reported that she was able to resolve her issue with Medicare, she will require a pass to [facility] to complete paperwork.     Medications     Current Facility-Administered Medications Ordered in Epic   Medication Dose Route Frequency Last Rate Last Dose     acetaminophen (TYLENOL) tablet 1,000 mg  1,000 mg Oral Q6H PRN   1,000 mg at 05/05/18 2002     bacitracin-polymyxin b (POLYSPORIN) ointment   Topical BID PRN         hydrOXYzine (ATARAX) tablet 25 mg  25 mg Oral Q4H PRN   25 mg at 05/11/18 1817     lamoTRIgine (LaMICtal) tablet 50 mg  50 mg Oral Daily   50 mg at 05/23/18 0715     loratadine (CLARITIN) tablet 10 mg  10 mg Oral At Bedtime   10 mg at 05/22/18 2211     mirtazapine (REMERON) tablet 45 mg  45 mg Oral At Bedtime   45 mg at 05/22/18 2211     QUEtiapine (SEROquel XR) 24 hr tablet 300 mg  300 mg Oral At Bedtime   300 mg at 05/22/18 2211     QUEtiapine (SEROquel) tablet 50 mg  50 mg Oral TID PRN   50 mg  "at 05/16/18 1920     sodium chloride (OCEAN) 0.65 % nasal spray 1-2 spray  1-2 spray Nasal Q1H PRN   2 spray at 05/23/18 0715     varenicline (CHANTIX) tablet 1 mg  1 mg Oral BID   1 mg at 05/23/18 0715     No current [facility]-ordered outpatient prescriptions on file.         Allergies      No Known Allergies     Medical Review of Systems     /76  Pulse 67  Temp 98.3  F (36.8  C) (Oral)  Resp 16  Ht 1.651 m (5' 5\")  Wt 60 kg (132 lb 3.2 oz)  SpO2 94%  BMI 22 kg/m2  Body mass index is 22 kg/(m^2).  A 10-point review of systems was performed by Steve Khan MD and is negative, no new findings.      Psychiatric Examination     Appearance Sitting in chair, dressed in casual clothes. Appears stated age. Ligature wound   Attitude Cooperative, less irritable   Orientation Oriented to person, place, time   Eye Contact Fair   Speech Regular rate, rhythm, volume and tone   Language Normal   Psychomotor Behavior Normal   Mood Remains somewhat irritable   Affect Slightly broader range   Thought Process Goal-Oriented, Intact   Associations Intact   Thought Content Patient is currently negative for suicidal ideation, positive for passive death wish, negative for plan or intent, able to contract no self harm and identify barriers to suicide.  Negative for obsessions, compulsions or psychosis.      Fund of Knowledge Average   Insight Slightly improved   Judgement Improving   Attention Span & Concentration Intact   Recent & Remote Memory Fair   Gait Normal   Muscle Tone Intact        Labs     Labs reviewed.  No results found for this or any previous visit (from the past 24 hour(s)).     Impression     Ms. \"Kippy\" Raheem Jimenez is a 52-year-old  transgender male with a long history of mental health and polysubstance use disorder. Has been sober for a number of years. Relapsed and has been smoking marijuana for the last 4 without telling Dr. Khan. She was admitted to [facility] and was " discharged to [place]. She was eventually removed from the outpatient sober housing and attempted to hang herself. Discussed starting pt on Lamictal.  Reviewed the side effects, benefits, and complications of medication. Pt gave verbal agreement to begin Lamictal 25mg qam with intent to titrate. She will remain on the unit for stabilization.         Diagnoses     1. Major depression, recurrent, severe, without psychotic features.  2. Dysthymic disorder.  3. Polysubstance use disorder.     Plan     1. Explained side effects, benefits, and complications of medications to the patient, Pt gave verbal consent.  2. Medication changes: Continue Lamictal titration, increase to 50mg qam.  3. Discussed treatment plan with patient and team.  4. Projected length of stay: Until pt has been stabilized with aftercare in place, waiting for admission to [place].  5. Place on 72 hour hold if pt tries to sign out of hospital.        Attestation:   Patient has been seen and evaluated by me, Steve Khan MD.    Patient ID:  Name: Raheem Jimenez  MRN: [id number]  Admission: 4/29/2018   YOB: 1965

## 2018-05-23 NOTE — PLAN OF CARE
Problem: Depressive Symptoms  Goal: Depressive Symptoms  Signs and symptoms of listed problems will be absent or manageable.   Outcome: No Change  Pt spent most of the shift in the lounge watching TV. Was pleasant and cooperative. Calm in mood. Attended wrap up group appropriately. Reported having a good day today after getting her insurance figured out, so she can be accepted into treatment. Awaiting placement at [place] [place].

## 2018-05-24 NOTE — PROGRESS NOTES
Patient was present in the room through all of [employer] today. She responded to one question at the very beginning of group and then slept through the remainder of group with no further participation. Her one answer was extremely superficial and lacked any insight.

## 2018-05-24 NOTE — PLAN OF CARE
Problem: Patient Care Overview  Goal: Team Discussion  Team Plan:   Outcome: Improving  BEHAVIORAL TEAM DISCUSSION    Participants: Dr. Khan, , Nursing staff and PA's  Progress: Improving  Continued Stay Criteria/Rationale: Until Stabilized with aftercare in place.  Medical/Physical: N/A  Precautions:   Behavioral Orders   Procedures     Code 1 - Restrict to Unit     Routine Programming     As clinically indicated     Status 15     Every 15 minutes.     Plan: Patient was given medication information and gave verbal consent. Plan of care was discussed with patient and team. Patient will continue hospitalization while awaiting open bed at [facility].  Rationale for change in precautions or plan: N/A

## 2018-05-24 NOTE — PROGRESS NOTES
"[facility] Psychiatric Progress Note       Interim History     The patient's care was discussed with the treatment team and chart notes were reviewed. Pt seen on SDU. Tolerating medications without side effects. Side effects, risks, and benefits of medications reviewed with patient. Denies suicidal or homicidal ideation. Pt reports things are \"going pretty good.\" She states that paperwork at [employer] was sent to [facility] and has been amended that she has MA and not Medicare. She will need to stop by [employer] to refund her $200 that she had to pay for the application. She states that [facility] provided an estimate of 1-2 weeks for admission.     Hospital Course     On May 1, 2018, pt was admitted due to worsening depression with an attempt to hang herself. She was started on a Lamictal titration at 25mg qam. On May 2, 2018, pt reported feeling slightly less depressed with Lamictal. She was encouraged to contact the [facility] in order to defer her upcoming court appearance as she is presently in the hospital. Pt requested being able to use a razor blade to shave, she was informed that no one was allowed to use 2nd to safety concerns for both pt and other patients. Pt was reminded of the serious suicide attempt he made leading to the current hospitalization. (she still has visible raw skin on neck where he attempted to hang herself) Pt wanted to sign out and she was told that she would be put on a 72 hold and commitment filed if she signed a 12 hour intent. On May 9, 2018, pt remains much the same, she is waiting to hear back about insurance to complete application for MA. On May 10, 2018, pt reports she continues to feel hopeless and depressed. She will need to have a Rule 25 assessment completed as she now has MA for insurance. On May 10, 2018, pt reported that she now has MA for insurance and is able to complete a Rule 25 assessment for CD treatment. On May 11, 2018, pt " stated she was able to set up an appointment for a Rule 25 assessment next week, encouraged her to engage in an AA meeting with her peers on the unit. On May 14, 2018, pt reported she had a good meeting with her mother and cousin. She is waiting for her Rule 25 assessment on Wednesday. On May 15, 2018, pt reported feeling less depressed this morning. Will increase Lamictal to 50mg qam. Rule 25 assessment scheduled for tomorrow at 1100. On May 16, 2018, pt went on a pass to have her Rule 25 assessment for CD treatment. On May 17, 2018, pt reported that she completed her assessment at [place] and will be waiting 1-2 weeks for admission. She is looking forward to treatment. On May 18, 2018, pt is still waiting admission at [facility]. She is pleasant and cooperative on the unit. On May 21, 2018, pt stated that she had been irritable and bored on the unit. She was contacted by [facility] and is waiting to hear back regarding her bed status. On  May 22, 2018, pt was determined to have Medicare due to SSDI, however she had not received payments since 2007. She is attempting to work this out with [employer] and Social Security. On May 23, 2018, pt reported that she was able to resolve her issue with Medicare, she will require a pass to [facility] to complete paperwork. On May 24, 2018, pt reports that she was able to get on the waitlist for [facility] and there will likely be an opening in 1-2 weeks. She will titrate on Lamictal in 5 days.     Medications     Current Facility-Administered Medications Ordered in Epic   Medication Dose Route Frequency Last Rate Last Dose     acetaminophen (TYLENOL) tablet 1,000 mg  1,000 mg Oral Q6H PRN   1,000 mg at 05/05/18 2002     bacitracin-polymyxin b (POLYSPORIN) ointment   Topical BID PRN         hydrOXYzine (ATARAX) tablet 25 mg  25 mg Oral Q4H PRN   25 mg at 05/11/18 1817     lamoTRIgine (LaMICtal) tablet 50 mg  50 mg Oral Daily   50 mg at 05/24/18 0734     loratadine (CLARITIN) tablet 10 mg  10 mg  "Oral At Bedtime   10 mg at 05/23/18 2104     mirtazapine (REMERON) tablet 45 mg  45 mg Oral At Bedtime   45 mg at 05/23/18 2104     QUEtiapine (SEROquel XR) 24 hr tablet 300 mg  300 mg Oral At Bedtime   300 mg at 05/23/18 2104     QUEtiapine (SEROquel) tablet 50 mg  50 mg Oral TID PRN   50 mg at 05/16/18 1920     sodium chloride (OCEAN) 0.65 % nasal spray 1-2 spray  1-2 spray Nasal Q1H PRN   2 spray at 05/23/18 0715     varenicline (CHANTIX) tablet 1 mg  1 mg Oral BID   1 mg at 05/24/18 0734     No current [facility]-ordered outpatient prescriptions on file.         Allergies      No Known Allergies     Medical Review of Systems     /67  Pulse 65  Temp 98.3  F (36.8  C) (Oral)  Resp 16  Ht 1.651 m (5' 5\")  Wt 60 kg (132 lb 3.2 oz)  SpO2 94%  BMI 22 kg/m2  Body mass index is 22 kg/(m^2).  A 10-point review of systems was performed by Steve Khan MD and is negative, no new findings.      Psychiatric Examination     Appearance Sitting in chair, dressed in casual clothes. Appears stated age. Ligature wound   Attitude Cooperative, much less irritable   Orientation Oriented to person, place, time   Eye Contact Fair   Speech Regular rate, rhythm, volume and tone   Language Normal   Psychomotor Behavior Normal   Mood Neutral, less irritable   Affect Broader range   Thought Process Goal-Oriented, Intact   Associations Intact   Thought Content Patient is currently negative for suicidal ideation, positive for passive death wish, negative for plan or intent, able to contract no self harm and identify barriers to suicide.  Negative for obsessions, compulsions or psychosis.      Fund of Knowledge Average   Insight Improving   Judgement Improving   Attention Span & Concentration Intact   Recent & Remote Memory Fair   Gait Normal   Muscle Tone Intact        Labs     Labs reviewed.  No results found for this or any previous visit (from the past 24 hour(s)).     Impression     Ms. \"Kippy\" Raheem Jimenez is a " 52-year-old  transgender male with a long history of mental health and polysubstance use disorder. Has been sober for a number of years. Relapsed and has been smoking marijuana for the last 4 without telling Dr. Khan. She was admitted to [facility] and was discharged to [place]. She was eventually removed from the outpatient sober housing and attempted to hang herself. Discussed starting pt on Lamictal.  Reviewed the side effects, benefits, and complications of medication. Pt gave verbal agreement to begin Lamictal 25mg qam with intent to titrate. She will remain on the unit for stabilization.         Diagnoses     1. Major depression, recurrent, severe, without psychotic features.  2. Dysthymic disorder.  3. Polysubstance use disorder.     Plan     1. Explained side effects, benefits, and complications of medications to the patient, Pt gave verbal consent.  2. Medication changes: Continue Lamictal titration, increase to 100mg qam in 5 days.  3. Discussed treatment plan with patient and team.  4. Projected length of stay: Until pt has been stabilized with aftercare in place, waiting for admission to [place].  5. Place on 72 hour hold if pt tries to sign out of hospital.        Attestation:   Patient has been seen and evaluated by me, Steve Khan MD.    Patient ID:  Name: Raheem Jimenez  MRN: [id number]  Admission: 4/29/2018   YOB: 1965

## 2018-05-24 NOTE — PLAN OF CARE
Problem: Depressive Symptoms  Goal: Depressive Symptoms  Signs and symptoms of listed problems will be absent or manageable.   Outcome: No Change  Patient presents with a full range affect and calm mood. Pt is pleasant and visible on the unit, seen in the lounge socializing with staff and peers. Pt attended unit programing and participated approprietly. During 1:1 pt states she is frustrated that she was now placed on a waiting list for a bed at [facility]. Pt has been helpful on the unit, offering to assist peer with her meal tray. Pt made multiple requests for coffee and snacks. Pt spent the duration of the shift watching TV.

## 2018-05-25 NOTE — PLAN OF CARE
"Problem: Depressive Symptoms  Goal: Depressive Symptoms  Signs and symptoms of listed problems will be absent or manageable.   Outcome: Improving  Pt was visible and social on the unit. Pleasant and cooperative. Calm in mood. Spent time watching TV in the lounge. Reported she is \"pretty good\" today. Did not attend groups this evening. Awaiting placement at [facility].       "

## 2018-05-25 NOTE — PROGRESS NOTES
Participated in Music Therapy group focused on social and emotional skill building through music listening and response/reflection.  Engaged and cooperative.  Does not converse with others, but does appear highly engaged in and moved by the music.

## 2018-05-25 NOTE — PROGRESS NOTES
[facility] Psychiatric Progress Note       Interim History     The patient's care was discussed with the treatment team and chart notes were reviewed. Pt seen on SDU. Tolerating medications without side effects. Side effects, risks, and benefits of medications reviewed with patient. Denies suicidal or homicidal ideation.  I met with the patient's, covering for Dr. Khan today.  The patient has no new complaints, so far is tolerating lamotrigine which is being titrated gradually.  Plan is for her to attend the [facility], that placement is pending.  The patient seems content to continue the current medication without changes.  She describes a history of mood instability and feels lamotrigine may be helping.     Hospital Course     On May 1, 2018, pt was admitted due to worsening depression with an attempt to hang herself. She was started on a Lamictal titration at 25mg qam. On May 2, 2018, pt reported feeling slightly less depressed with Lamictal. She was encouraged to contact the [facility] in order to defer her upcoming court appearance as she is presently in the hospital. Pt requested being able to use a razor blade to shave, she was informed that no one was allowed to use 2nd to safety concerns for both pt and other patients. Pt was reminded of the serious suicide attempt he made leading to the current hospitalization. (she still has visible raw skin on neck where he attempted to hang herself) Pt wanted to sign out and she was told that she would be put on a 72 hold and commitment filed if she signed a 12 hour intent. On May 9, 2018, pt remains much the same, she is waiting to hear back about insurance to complete application for MA. On May 10, 2018, pt reports she continues to feel hopeless and depressed. She will need to have a Rule 25 assessment completed as she now has MA for insurance. On May 10, 2018, pt reported that she now has MA for insurance and is able to  complete a Rule 25 assessment for CD treatment. On May 11, 2018, pt stated she was able to set up an appointment for a Rule 25 assessment next week, encouraged her to engage in an AA meeting with her peers on the unit. On May 14, 2018, pt reported she had a good meeting with her mother and cousin. She is waiting for her Rule 25 assessment on Wednesday. On May 15, 2018, pt reported feeling less depressed this morning. Will increase Lamictal to 50mg qam. Rule 25 assessment scheduled for tomorrow at 1100. On May 16, 2018, pt went on a pass to have her Rule 25 assessment for CD treatment. On May 17, 2018, pt reported that she completed her assessment at [facility] and will be waiting 1-2 weeks for admission. She is looking forward to treatment. On May 18, 2018, pt is still waiting admission at [facility]. She is pleasant and cooperative on the unit. On May 21, 2018, pt stated that she had been irritable and bored on the unit. She was contacted by [facility] and is waiting to hear back regarding her bed status. On  May 22, 2018, pt was determined to have Medicare due to SSDI, however she had not received payments since 2007. She is attempting to work this out with [employer] and Social Security. On May 23, 2018, pt reported that she was able to resolve her issue with Medicare, she will require a pass to [facility] to complete paperwork. On May 24, 2018, pt reports that she was able to get on the waitlist for [facility] and there will likely be an opening in 1-2 weeks. She will titrate on Lamictal in 5 days.  On May 25, 2018 the patient reported stable progress, no acute symptoms or side effects from medications.  We continue to titrate lamotrigine slowly.     Medications     Current Facility-Administered Medications Ordered in Epic   Medication Dose Route Frequency Last Rate Last Dose     acetaminophen (TYLENOL) tablet 1,000 mg  1,000 mg Oral Q6H PRN   1,000 mg at 05/05/18 2002     bacitracin-polymyxin b (POLYSPORIN) ointment   Topical BID  "PRN         hydrOXYzine (ATARAX) tablet 25 mg  25 mg Oral Q4H PRN   25 mg at 05/11/18 1817     [START ON 5/30/2018] lamoTRIgine (LaMICtal) tablet 100 mg  100 mg Oral Daily         lamoTRIgine (LaMICtal) tablet 50 mg  50 mg Oral Daily   50 mg at 05/25/18 0756     loratadine (CLARITIN) tablet 10 mg  10 mg Oral At Bedtime   10 mg at 05/24/18 2043     mirtazapine (REMERON) tablet 45 mg  45 mg Oral At Bedtime   45 mg at 05/24/18 2043     QUEtiapine (SEROquel XR) 24 hr tablet 300 mg  300 mg Oral At Bedtime   300 mg at 05/24/18 2043     QUEtiapine (SEROquel) tablet 50 mg  50 mg Oral TID PRN   50 mg at 05/16/18 1920     sodium chloride (OCEAN) 0.65 % nasal spray 1-2 spray  1-2 spray Nasal Q1H PRN   2 spray at 05/23/18 0715     varenicline (CHANTIX) tablet 1 mg  1 mg Oral BID   1 mg at 05/25/18 0756     No current [facility]-ordered outpatient prescriptions on file.         Allergies      No Known Allergies     Medical Review of Systems     /67  Pulse 65  Temp 97.9  F (36.6  C) (Oral)  Resp 16  Ht 1.651 m (5' 5\")  Wt 60 kg (132 lb 3.2 oz)  SpO2 94%  BMI 22 kg/m2  Body mass index is 22 kg/(m^2).  A 10-point review of systems was performed by Florian Fong MD and is negative, no new findings.      Psychiatric Examination     Appearance  Standing in the room, dressed in casual clothes. Appears stated age. Ligature wound, feminine dress and appearance, but speckled   Attitude Cooperative   Orientation Oriented to person, place, time   Eye Contact Fair   Speech Regular rate, rhythm, volume and tone   Language Normal   Psychomotor Behavior Normal   Mood Neutral, less irritable   Affect  subdued, constricted   Thought Process Goal-Oriented, Intact   Associations Intact   Thought Content Patient is currently negative for suicidal ideation, positive for passive death wish, negative for plan or intent, able to contract no self harm and identify barriers to suicide.  Negative for obsessions, compulsions or psychosis.  " "    Fund of Knowledge Average   Insight Improving   Judgement Improving   Attention Span & Concentration Intact   Recent & Remote Memory Fair   Gait Normal   Muscle Tone Intact        Labs     Labs reviewed.  No results found for this or any previous visit (from the past 24 hour(s)).     Impression     Ms. \"Kippy\" Raheem Jimenez is a 52-year-old  transgender male with a long history of mental health and polysubstance use disorder.  She is awaiting placement, seems to be near baseline, we will   Diagnoses     1. Major depression, recurrent, severe, without psychotic features.  2. Dysthymic disorder.  3. Polysubstance use disorder.  4. Gender dysphoria     Plan     1. Explained side effects, benefits, and complications of medications to the patient, Pt gave           verbal consent.  2. Medication changes: Continue Lamictal titration, increase to 100mg qam in 5 days.  3. Discussed treatment plan with patient and team.  4. Projected length of stay: Until pt has been stabilized with aftercare in place, waiting for               admission to [facility].  5. Place on 72 hour hold if pt tries to sign out of hospital.        Attestation:   Patient has been seen and evaluated by me, Florian Fong MD.    Patient ID:  Name: Raheem Jimenez  MRN: [id number]  Admission: 4/29/2018   YOB: 1965   "

## 2018-05-25 NOTE — PROGRESS NOTES
-Pt was visible and social on the unit. Affect is blunt but calm in mood. Spent time watching socializing and watching TV in the lounge with peers. Denies any hallucinations.Still awaiting placement at [facility].

## 2018-05-26 NOTE — PLAN OF CARE
Problem: Depressive Symptoms  Goal: Depressive Symptoms  Signs and symptoms of listed problems will be absent or manageable.   05/26 Pt is calm and cooperative on the unit and spent the shift in the lounge watching tv and socializing with peers. Pt presented with a flat affect but brightens upon approach and polite to staff and peers.  Pt stated that she relieved that the issues with her medicare are cleared up and is going to have to be patient as she awaits a spot at [facility] for placement. Denies any SI but anxious as she awaits the unknown

## 2018-05-26 NOTE — PLAN OF CARE
Problem: Depressive Symptoms  Goal: Depressive Symptoms  Signs and symptoms of listed problems will be absent or manageable.   Outcome: No Change  Pt is calm and cooperative on the unit and spent the shift in the lounge watching tv and socializing with peers. Pt presented with a flat affect but brightens upon approach for those that she feels comfortable with. Pt stated that she relieved that the issues with her medicare are cleared up and is going to have to be patient as she awaits a spot at [facility] for placement. Pt is anxious to start hormones and feels that this will make a big difference in her life. She stated that she is feeling more comfortable in her skin though.

## 2018-05-26 NOTE — PROGRESS NOTES
05/26 Pt is calm and cooperative on the unit and spent the shift in the lounge watching tv and socializing with peers. Pt presented with a flat affect but brightens upon approach and polite to staff and peers.  Pt stated that she relieved that the issues with her medicare are cleared up and is going to have to be patient as she awaits a spot at [facility] for placement. Denies any SI but anxious as she awaits the unknown

## 2018-05-27 NOTE — PLAN OF CARE
Problem: Depressive Symptoms  Goal: Depressive Symptoms  Signs and symptoms of listed problems will be absent or manageable.   Outcome: No Change  Patient presents with a flat affect but brightens upon approach. Pt is visible on the unit, seen in the lounge watching TV. Staff prompted the pt to participate in alternative activities, pt refused. Pt states she is frustrated that staff keeps bringing up the topic of sharing the remote as pt belives she has improved and is now sharing the TV approprietly. Pt appears tense when a large group of visitors utilized the lounge, she states they where interrupting the movie. Staff has communicated to pt not to touch the thermostat in the lounge however multiple peers report pt frequently changes the thermostat when staff leaves the room.

## 2018-05-27 NOTE — PROGRESS NOTES
Pt resents with blunt affect but calm mood . Observe interacting with peers and very polite and respectful to peers and staff during conversation. Ate all her food and spent majority of time in the lounge watching tv with peers. No issue with remote control for this shift

## 2018-05-28 NOTE — PLAN OF CARE
"Problem: Depressive Symptoms  Goal: Depressive Symptoms  Signs and symptoms of listed problems will be absent or manageable.   Pt states that she is having a \"good day\" today. No c/o hogging the TV by the other pts. One pt stated that she still turns up the heat even after staff has told her not to touch the thermostat. She has been social and engages peers in conversation. Spent the entire evening in the lounge. During wrap-up, pt stated that she is trying to be patient, even though she has been here for 1 month..      "

## 2018-05-28 NOTE — PLAN OF CARE
Problem: Depressive Symptoms  Goal: Depressive Symptoms  Signs and symptoms of listed problems will be absent or manageable.   Outcome: Improving  Pt was active and pleasant within the SDU. Pt presents with a full range affect and calm mood. Pt spent the majority of the shift in the lounge watching tv and interacting with peers. Pt is actively social and open with peers and staff. Pt seems to be bonding well with the other patients, which is leading towards her being more courteous with the remote. Pt had a good day and is working on being more patient. Pt ate all of her food and was med compliant.

## 2018-05-29 NOTE — PLAN OF CARE
Problem: Patient Care Overview  Goal: Team Discussion  Team Plan:   Outcome: No Change  BEHAVIORAL TEAM DISCUSSION    Participants: Dr. Khan, , Nursing Staff, Psychiatric Associate  Progress: no change in status. Pt awaiting placement  Continued Stay Criteria/Rationale: Pt awaiting placement before discharge  Medical/Physical: N/A  Precautions:   Behavioral Orders   Procedures     Code 1 - Restrict to Unit     Routine Programming     As clinically indicated     Status 15     Every 15 minutes.     Plan: Pt is waiting on placement to a treatment facility before discharge  Rationale for change in precautions or plan: N/A

## 2018-05-29 NOTE — PROGRESS NOTES
[facility] called. Clinical director reviewed for admission and denied- said suicide attempt too acute for them and patient probably needs dual diagnosis program. Patient can call  to discuss if she wishes to. Next step would be to call [facility] for their recommendations..    Patient was given the above information. She said her next choice is [facility] and that she will call [facility] about that.

## 2018-05-29 NOTE — PLAN OF CARE
"Problem: Depressive Symptoms  Goal: Depressive Symptoms  Signs and symptoms of listed problems will be absent or manageable.   Outcome: Therapy, progress toward functional goals is gradual  Pt requested a 1:1 to discuss feelings of frustration at being denied entry to [facility] because of the suicide attempt.  Pt said, \" I called and requested an appeal, I am not suicidal\". \" I feel 5 weeks have been wasted and I should have just gone to the half way house and pursued my transgender therapy on my own, I know how to get to .\"   Pt gave poor eye contact. Pt mostly looked at the floor during the 1:1 giving brief eye contact.  Pt agreed to continue making calls to [facility] as that is the other facility pt was interested in going to . Pt is worried about reaching them as he continues to get a busy signal.  Pt is attending groups. Pt said, \"At least I didn't act out!\" \"Several weeks ago I would have been swearing and complaining about this, I'm not acting out so that is a good thing.\" Emotional support given. Pt was encouraged to continue to try and reach [facility]. Pt is attending groups and plans to talk with the doctor tomorrow about possible discharge.      "

## 2018-05-29 NOTE — PLAN OF CARE
Problem: Depressive Symptoms  Goal: Depressive Symptoms  Signs and symptoms of listed problems will be absent or manageable.   Outcome: No Change  Pt spent all shift in the lounge watching tv and socializing. Appears calm in mood and has a full range affect. Can be intrusive at times, observed trying to take another Pt her dinner tray to her room and getting her snacks at HS. Was also observed trying to encourage another patient to come out of her room and join them in the lounge. Pt only attended wrap up group.

## 2018-05-29 NOTE — PROGRESS NOTES
[facility] Psychiatric Progress Note       Interim History     The patient's care was discussed with the treatment team and chart notes were reviewed. Pt seen on SDU. Tolerating medications without side effects. Side effects, risks, and benefits of medications reviewed with patient. Denies suicidal or homicidal ideation. She is still awaiting admission to [facility] and is hopeful for admission soon. She will increase to Lamictal 100mg qam tomorrow.     Hospital Course     On May 1, 2018, pt was admitted due to worsening depression with an attempt to hang herself. She was started on a Lamictal titration at 25mg qam. On May 2, 2018, pt reported feeling slightly less depressed with Lamictal. She was encouraged to contact the [facility] in order to defer her upcoming court appearance as she is presently in the hospital. Pt requested being able to use a razor blade to shave, she was informed that no one was allowed to use 2nd to safety concerns for both pt and other patients. Pt was reminded of the serious suicide attempt he made leading to the current hospitalization. (she still has visible raw skin on neck where he attempted to hang herself) Pt wanted to sign out and she was told that she would be put on a 72 hold and commitment filed if she signed a 12 hour intent. On May 9, 2018, pt remains much the same, she is waiting to hear back about insurance to complete application for MA. On May 10, 2018, pt reports she continues to feel hopeless and depressed. She will need to have a Rule 25 assessment completed as she now has MA for insurance. On May 10, 2018, pt reported that she now has MA for insurance and is able to complete a Rule 25 assessment for CD treatment. On May 11, 2018, pt stated she was able to set up an appointment for a Rule 25 assessment next week, encouraged her to engage in an AA meeting with her peers on the unit. On May 14, 2018, pt reported she had a good meeting with  her mother and cousin. She is waiting for her Rule 25 assessment on Wednesday. On May 15, 2018, pt reported feeling less depressed this morning. Will increase Lamictal to 50mg qam. Rule 25 assessment scheduled for tomorrow at 1100. On May 16, 2018, pt went on a pass to have her Rule 25 assessment for CD treatment. On May 17, 2018, pt reported that she completed her assessment at [facility] and will be waiting 1-2 weeks for admission. She is looking forward to treatment. On May 18, 2018, pt is still waiting admission at [facility]. She is pleasant and cooperative on the unit. On May 21, 2018, pt stated that she had been irritable and bored on the unit. She was contacted by [facility] and is waiting to hear back regarding her bed status. On  May 22, 2018, pt was determined to have Medicare due to SSDI, however she had not received payments since 2007. She is attempting to work this out with [employer] and Social Security. On May 23, 2018, pt reported that she was able to resolve her issue with Medicare, she will require a pass to [facility] to complete paperwork. On May 24, 2018, pt reports that she was able to get on the waitlist for [facility] and there will likely be an opening in 1-2 weeks. She will titrate on Lamictal in 5 days.  On May 25, 2018 the patient reported stable progress, no acute symptoms or side effects from medications.  We continue to titrate lamotrigine slowly. On May 29, 2018, pt continues to wait for admission to [facility]. She will increase on Lamictal to 100mg qam tomorrow.     Medications     Current Facility-Administered Medications Ordered in Epic   Medication Dose Route Frequency Last Rate Last Dose     acetaminophen (TYLENOL) tablet 1,000 mg  1,000 mg Oral Q6H PRN   1,000 mg at 05/05/18 2002     bacitracin-polymyxin b (POLYSPORIN) ointment   Topical BID PRN         hydrOXYzine (ATARAX) tablet 25 mg  25 mg Oral Q4H PRN   25 mg at 05/11/18 1817     [START ON 5/30/2018] lamoTRIgine (LaMICtal) tablet 100 mg  100 mg  "Oral Daily         loratadine (CLARITIN) tablet 10 mg  10 mg Oral At Bedtime   10 mg at 05/28/18 2112     mirtazapine (REMERON) tablet 45 mg  45 mg Oral At Bedtime   45 mg at 05/28/18 2147     QUEtiapine (SEROquel XR) 24 hr tablet 300 mg  300 mg Oral At Bedtime   300 mg at 05/28/18 2147     QUEtiapine (SEROquel) tablet 50 mg  50 mg Oral TID PRN   50 mg at 05/16/18 1920     sodium chloride (OCEAN) 0.65 % nasal spray 1-2 spray  1-2 spray Nasal Q1H PRN   1 spray at 05/27/18 1743     varenicline (CHANTIX) tablet 1 mg  1 mg Oral BID   1 mg at 05/29/18 0741     No current [facility]-ordered outpatient prescriptions on file.         Allergies      No Known Allergies     Medical Review of Systems     /73  Pulse 77  Temp 100.2  F (37.9  C) (Oral)  Resp 17  Ht 1.651 m (5' 5\")  Wt 60.1 kg (132 lb 6.4 oz)  SpO2 94%  BMI 22.03 kg/m2  Body mass index is 22.03 kg/(m^2).  A 10-point review of systems was performed by Steve Khan MD and is negative, no new findings.      Psychiatric Examination     Appearance Sitting in chair, dressed in casual clothes. Appears stated age. Ligature wound, feminine dress and appearance, but speckled   Attitude Cooperative   Orientation Oriented to person, place, time   Eye Contact Fair   Speech Regular rate, rhythm, volume and tone   Language Normal   Psychomotor Behavior Normal   Mood Neutral, less irritable   Affect Constricted range   Thought Process Goal-Oriented, Intact   Associations Intact   Thought Content Patient is currently negative for suicidal ideation, positive for passive death wish, negative for plan or intent, able to contract no self harm and identify barriers to suicide.  Negative for obsessions, compulsions or psychosis.      Fund of Knowledge Average   Insight Improving   Judgement Improving   Attention Span & Concentration Intact   Recent & Remote Memory Fair   Gait Normal   Muscle Tone Intact        Labs     Labs reviewed.  No results found for this or any " "previous visit (from the past 24 hour(s)).     Impression     Ms. \"Kippy\" Raheem Jimenez is a 52-year-old  transgender male with a long history of mental health and polysubstance use disorder.  She is awaiting placement, seems to be near baseline, we will   Diagnoses     1. Major depression, recurrent, severe, without psychotic features.  2. Dysthymic disorder.  3. Polysubstance use disorder.  4. Gender dysphoria     Plan     1. Explained side effects, benefits, and complications of medications to the patient, Pt gave verbal consent.  2. Medication changes: Continue Lamictal titration, increase to 100mg qam tomorrow.  3. Discussed treatment plan with patient and team.  4. Projected length of stay: Until pt has been stabilized with aftercare in place, waiting for admission to [facility].  5. Place on 72 hour hold if pt tries to sign out of hospital.        Attestation:   Patient has been seen and evaluated by me, Steve Khan MD.    Patient ID:  Name: Raheem Jimenez  MRN: [id number]  Admission: 4/29/2018   YOB: 1965   "

## 2018-05-30 NOTE — PLAN OF CARE
Problem: General Plan of Care (Inpatient Behavioral)  Goal: Discharge Planning  Patient attended process group on 5/29 and 5/30/18. Participation complete and appropriate.

## 2018-05-30 NOTE — PLAN OF CARE
"Problem: Depressive Symptoms  Goal: Depressive Symptoms  Signs and symptoms of listed problems will be absent or manageable.   Outcome: No Change  Pt presented a blunt, flat affect in the begging of the shift. Pt was upset about being denied at [facility], pt felt that they wasted his time by keeping her waiting for so long. Pt said that \"It is hard to keep positive when stuff like this happens.\" Pt attended groups and participated appropriately. Pt got out some of his frustration out in wrap up group and was able to talk it out with staff and was given advice on what to do next. Pt says that he is hopeful for [facility]. Pt spent most of the shift watching tv in the lounge. Pt was med compliant.        "

## 2018-05-30 NOTE — PROGRESS NOTES
[facility] Psychiatric Progress Note       Interim History     The patient's care was discussed with the treatment team and chart notes were reviewed. Pt seen on SDU. Tolerating medications without side effects. Side effects, risks, and benefits of medications reviewed with patient. Denies suicidal or homicidal ideation. Pt received a denial from [facility] yesterday afternoon as they did not feel comfortable with her recent suicide attempt. She called and left a message to appeal her denial. Plan to contact the psychiatrist at [place] in regards to this. Informed pt that MD will be out of town and Dr. Alvarez will be covering his service until he returns.      Hospital Course     On May 1, 2018, pt was admitted due to worsening depression with an attempt to hang herself. She was started on a Lamictal titration at 25mg qam. On May 2, 2018, pt reported feeling slightly less depressed with Lamictal. She was encouraged to contact the [facility] in order to defer her upcoming court appearance as she is presently in the hospital. Pt requested being able to use a razor blade to shave, she was informed that no one was allowed to use 2nd to safety concerns for both pt and other patients. Pt was reminded of the serious suicide attempt he made leading to the current hospitalization. (she still has visible raw skin on neck where he attempted to hang herself) Pt wanted to sign out and she was told that she would be put on a 72 hold and commitment filed if she signed a 12 hour intent. On May 9, 2018, pt remains much the same, she is waiting to hear back about insurance to complete application for MA. On May 10, 2018, pt reports she continues to feel hopeless and depressed. She will need to have a Rule 25 assessment completed as she now has MA for insurance. On May 10, 2018, pt reported that she now has MA for insurance and is able to complete a Rule 25 assessment for CD treatment. On May 11, 2018, pt  stated she was able to set up an appointment for a Rule 25 assessment next week, encouraged her to engage in an AA meeting with her peers on the unit. On May 14, 2018, pt reported she had a good meeting with her mother and cousin. She is waiting for her Rule 25 assessment on Wednesday. On May 15, 2018, pt reported feeling less depressed this morning. Will increase Lamictal to 50mg qam. Rule 25 assessment scheduled for tomorrow at 1100. On May 16, 2018, pt went on a pass to have her Rule 25 assessment for CD treatment. On May 17, 2018, pt reported that she completed her assessment at [facility] and will be waiting 1-2 weeks for admission. She is looking forward to treatment. On May 18, 2018, pt is still waiting admission at [facility]. She is pleasant and cooperative on the unit. On May 21, 2018, pt stated that she had been irritable and bored on the unit. She was contacted by [facility] and is waiting to hear back regarding her bed status. On  May 22, 2018, pt was determined to have Medicare due to SSDI, however she had not received payments since 2007. She is attempting to work this out with [facility] and Social Security. On May 23, 2018, pt reported that she was able to resolve her issue with Medicare, she will require a pass to [facility] to complete paperwork. On May 24, 2018, pt reports that she was able to get on the waitlist for [facility] and there will likely be an opening in 1-2 weeks. She will titrate on Lamictal in 5 days.  On May 25, 2018 the patient reported stable progress, no acute symptoms or side effects from medications.  We continue to titrate lamotrigine slowly. On May 29, 2018, pt continues to wait for admission to [facility]. She will increase on Lamictal to 100mg qam tomorrow. On May 30, 2018, she was informed that she was denied from [facility] and is attempting to appeal her denial. The psychiatrist at [facility] will be contact in regards to this denial.     Medications     Current Facility-Administered Medications Ordered  "in Epic   Medication Dose Route Frequency Last Rate Last Dose     acetaminophen (TYLENOL) tablet 1,000 mg  1,000 mg Oral Q6H PRN   1,000 mg at 05/05/18 2002     bacitracin-polymyxin b (POLYSPORIN) ointment   Topical BID PRN         hydrOXYzine (ATARAX) tablet 25 mg  25 mg Oral Q4H PRN   25 mg at 05/11/18 1817     lamoTRIgine (LaMICtal) tablet 100 mg  100 mg Oral Daily   100 mg at 05/30/18 0751     loratadine (CLARITIN) tablet 10 mg  10 mg Oral At Bedtime   10 mg at 05/29/18 2147     mirtazapine (REMERON) tablet 45 mg  45 mg Oral At Bedtime   45 mg at 05/29/18 2148     QUEtiapine (SEROquel XR) 24 hr tablet 300 mg  300 mg Oral At Bedtime   300 mg at 05/29/18 2148     QUEtiapine (SEROquel) tablet 50 mg  50 mg Oral TID PRN   50 mg at 05/16/18 1920     sodium chloride (OCEAN) 0.65 % nasal spray 1-2 spray  1-2 spray Nasal Q1H PRN   1 spray at 05/27/18 1743     varenicline (CHANTIX) tablet 1 mg  1 mg Oral BID   1 mg at 05/30/18 0750     No current [facility]-ordered outpatient prescriptions on file.         Allergies      No Known Allergies     Medical Review of Systems     /67  Pulse 77  Temp 98.6  F (37  C) (Oral)  Resp 16  Ht 1.651 m (5' 5\")  Wt 60.1 kg (132 lb 6.4 oz)  SpO2 94%  BMI 22.03 kg/m2  Body mass index is 22.03 kg/(m^2).  A 10-point review of systems was performed by Steve Khan MD and is negative, no new findings.      Psychiatric Examination     Appearance Sitting in chair, dressed in casual clothes. Appears stated age. Ligature wound, feminine dress and appearance, but speckled   Attitude Cooperative   Orientation Oriented to person, place, time   Eye Contact Fair   Speech Regular rate, rhythm, volume and tone   Language Normal   Psychomotor Behavior Normal   Mood Neutral, much less irritable   Affect Constricted range   Thought Process Goal-Oriented, Intact   Associations Intact   Thought Content Patient is currently negative for suicidal ideation, negative for plan or intent, able to " "contract no self harm and identify barriers to suicide.  Negative for obsessions, compulsions or psychosis.      Fund of Knowledge Average   Insight Improving   Judgement Improving   Attention Span & Concentration Intact   Recent & Remote Memory Fair   Gait Normal   Muscle Tone Intact on visual inspection.        Labs     Labs reviewed.  No results found for this or any previous visit (from the past 24 hour(s)).     Impression     Ms. \"Kippy\" Raheem Jimenez is a 52-year-old  transgender male with a long history of mental health and polysubstance use disorder.  She is awaiting placement, seems to be near baseline, she is attempting to appeal her denial from [facility].   Diagnoses     1. Major depression, recurrent, severe, without psychotic features.  2. Dysthymic disorder.  3. Polysubstance use disorder.  4. Gender dysphoria     Plan     1. Explained side effects, benefits, and complications of medications to the patient, Pt gave verbal consent.  2. Medication changes: Continue current medications.  3. Discussed treatment plan with patient and team.  4. Projected length of stay: Until pt has been stabilized with aftercare in place, waiting for admission to [facility].  5. Place on 72 hour hold if pt tries to sign out of hospital.        Attestation:   Patient has been seen and evaluated by me, Steve Khan MD.    Patient ID:  Name: Raheem Jimenez  MRN: [id number]  Admission: 4/29/2018   YOB: 1965   "

## 2018-05-31 NOTE — PLAN OF CARE
Problem: Patient Care Overview  Goal: Discharge Needs Assessment  Patient attended Process Group, but engaged in odd behaviors. During an exercise that required participation between patient and her peers, patient abruptly got up and left the room leaving the peer she was assigned to with no partner. She then came in towards the end of that exercise and stood and talked to a peer on the other side of the room. When the group was directed to focus back on a group discussion, she remained standing on the other side of the room talking to her peer and ignored the cue to refocus on the discussion. Later in the group she gave very superficial answers to the group discussion question.

## 2018-05-31 NOTE — PROGRESS NOTES
[facility] Psychiatric Progress Note       Interim History     The patient's care was discussed with the treatment team and chart notes were reviewed. Pt seen on SDU. Staff reports he has been the same overall. No safety concerns. Continues to try to discharge plan for treatment. [facility] is recommending dual diagnosis. Eliana reports she is fine. Notes good mood, no anxiety, and intact sleep, appetite and energy. Denies side effects from medications. He would like to continue current medications. Is hopeful he can find treatment placement soon.      Hospital Course     On May 1, 2018, pt was admitted due to worsening depression with an attempt to hang herself. She was started on a Lamictal titration at 25mg qam. On May 2, 2018, pt reported feeling slightly less depressed with Lamictal. She was encouraged to contact the [facility] in order to defer her upcoming court appearance as she is presently in the hospital. Pt requested being able to use a razor blade to shave, she was informed that no one was allowed to use 2nd to safety concerns for both pt and other patients. Pt was reminded of the serious suicide attempt he made leading to the current hospitalization. (she still has visible raw skin on neck where he attempted to hang herself) Pt wanted to sign out and she was told that she would be put on a 72 hold and commitment filed if she signed a 12 hour intent. On May 9, 2018, pt remains much the same, she is waiting to hear back about insurance to complete application for MA. On May 10, 2018, pt reports she continues to feel hopeless and depressed. She will need to have a Rule 25 assessment completed as she now has MA for insurance. On May 10, 2018, pt reported that she now has MA for insurance and is able to complete a Rule 25 assessment for CD treatment. On May 11, 2018, pt stated she was able to set up an appointment for a Rule 25 assessment next week, encouraged her to engage in  an AA meeting with her peers on the unit. On May 14, 2018, pt reported she had a good meeting with her mother and cousin. She is waiting for her Rule 25 assessment on Wednesday. On May 15, 2018, pt reported feeling less depressed this morning. Will increase Lamictal to 50mg qam. Rule 25 assessment scheduled for tomorrow at 1100. On May 16, 2018, pt went on a pass to have her Rule 25 assessment for CD treatment. On May 17, 2018, pt reported that she completed her assessment at [facility] and will be waiting 1-2 weeks for admission. She is looking forward to treatment. On May 18, 2018, pt is still waiting admission at [facility]. She is pleasant and cooperative on the unit. On May 21, 2018, pt stated that she had been irritable and bored on the unit. She was contacted by [facility] and is waiting to hear back regarding her bed status. On  May 22, 2018, pt was determined to have Medicare due to SSDI, however she had not received payments since 2007. She is attempting to work this out with [facility] and Social Security. On May 23, 2018, pt reported that she was able to resolve her issue with Medicare, she will require a pass to [facility] to complete paperwork. On May 24, 2018, pt reports that she was able to get on the waitlist for [facility] and there will likely be an opening in 1-2 weeks. She will titrate on Lamictal in 5 days.  On May 25, 2018 the patient reported stable progress, no acute symptoms or side effects from medications.  We continue to titrate lamotrigine slowly. On May 29, 2018, pt continues to wait for admission to [facility]. She will increase on Lamictal to 100mg qam tomorrow. On May 30, 2018, she was informed that she was denied from [facility] and is attempting to appeal her denial. The psychiatrist at [facility] will be contact in regards to this denial.     Medications     Current Facility-Administered Medications Ordered in Epic   Medication Dose Route Frequency Last Rate Last Dose     acetaminophen (TYLENOL) tablet 1,000 mg   "1,000 mg Oral Q6H PRN   1,000 mg at 05/05/18 2002     bacitracin-polymyxin b (POLYSPORIN) ointment   Topical BID PRN         hydrOXYzine (ATARAX) tablet 25 mg  25 mg Oral Q4H PRN   25 mg at 05/11/18 1817     lamoTRIgine (LaMICtal) tablet 100 mg  100 mg Oral Daily   100 mg at 05/31/18 0818     loratadine (CLARITIN) tablet 10 mg  10 mg Oral At Bedtime   10 mg at 05/30/18 2110     mirtazapine (REMERON) tablet 45 mg  45 mg Oral At Bedtime   45 mg at 05/30/18 2110     QUEtiapine (SEROquel XR) 24 hr tablet 300 mg  300 mg Oral At Bedtime   300 mg at 05/30/18 2110     QUEtiapine (SEROquel) tablet 50 mg  50 mg Oral TID PRN   50 mg at 05/16/18 1920     sodium chloride (OCEAN) 0.65 % nasal spray 1-2 spray  1-2 spray Nasal Q1H PRN   1 spray at 05/27/18 1743     varenicline (CHANTIX) tablet 1 mg  1 mg Oral BID   1 mg at 05/31/18 0818     No current [facility]-ordered outpatient prescriptions on file.         Allergies      No Known Allergies     Medical Review of Systems     /75  Pulse 77  Temp 98.9  F (37.2  C) (Oral)  Resp 16  Ht 1.651 m (5' 5\")  Wt 60.1 kg (132 lb 6.4 oz)  SpO2 94%  BMI 22.03 kg/m2  Body mass index is 22.03 kg/(m^2).  A 10-point review of systems was performed by Jhon Alvarez MD and is negative, no new findings.      Psychiatric Examination     Appearance Sitting in chair, dressed in casual clothes. Appears stated age. Ligature wound, feminine dress and appearance   Attitude Cooperative   Orientation Oriented to person, place, time   Eye Contact Fair   Speech Regular rate, rhythm, volume and tone   Language Normal   Psychomotor Behavior Normal   Mood Neutral, much less irritable   Affect Constricted range, mild irritability    Thought Process Goal-Oriented, Intact   Associations Intact   Thought Content Patient is currently negative for suicidal ideation, negative for plan or intent, able to contract no self harm and identify barriers to suicide.  Negative for obsessions, compulsions or psychosis.  " "    Fund of Knowledge Average   Insight Improving   Judgement Improving   Attention Span & Concentration Intact   Recent & Remote Memory Fair   Gait Normal   Muscle Tone Intact on visual inspection.        Labs     Labs reviewed.  No results found for this or any previous visit (from the past 24 hour(s)).     Impression     Ms. \"Kippy\" Raheem Jimenez is a 52-year-old  transgender male with a long history of mental health and polysubstance use disorder.  She is awaiting placement, seems to be near baseline, she is attempting to appeal her denial from [facility].   Diagnoses     1. Major depression, recurrent, severe, without psychotic features.  2. Dysthymic disorder.  3. Polysubstance use disorder.  4. Gender dysphoria     Plan     1. Explained side effects, benefits, and complications of medications to the patient, Pt gave verbal consent.  2. Medication changes: Continue current medications.  3. Discussed treatment plan with patient and team.  4. Projected length of stay: Until pt has been stabilized with aftercare in place, waiting for admission to CD treatment, though we will look into dual diagnosis options as [facility] declined given level of mental illness symptoms.   5. Bill Linn has recommended he be placed on 72 hour hold if pt tries to sign out of hospital.        Attestation:   Patient has been seen and evaluated by me, Jhon Alvarez MD.    Patient ID:  Name: Raheem Jimenez  MRN: [id number]  Admission: 4/29/2018   YOB: 1965   "

## 2018-05-31 NOTE — PROGRESS NOTES
"Pt requested a 1:1 and pt said, \"I can't take it anymore!  I can keep myself from drinking. I need to leave even if it is AMA.\"  Pt said, \"I have been here long enough and can't shad another weekend.\"  Pt was told that Dr. Khan's note specified that if he demands to leave a 72 hour hold would be placed due to the severity of his attempt and then he would file for commitment. Pt was asked if he wanted to proceed?  Pt said, \"No I will wait for Dr. Adorno's return and talk with him.\"  "

## 2018-05-31 NOTE — PLAN OF CARE
Problem: Depressive Symptoms  Goal: Depressive Symptoms  Signs and symptoms of listed problems will be absent or manageable.   Outcome: Declining  See previous note. Pt requesting discharge. Rescinded request when faced with being placed on hold. Pt is tired of being here and wishes to move on to treatment. Pt has been attending groups, compliant with medication. Pt has watched TV during free time in the lounge.

## 2018-05-31 NOTE — PLAN OF CARE
Problem: Patient Care Overview  Goal: Team Discussion  Team Plan:   Outcome: No Change  BEHAVIORAL TEAM DISCUSSION    Participants: Dr. Alvarez, , Nursing staff and PA's  Progress: No Change  Continued Stay Criteria/Rationale: Until Stabilized with aftercare in place  Medical/Physical: N/A  Precautions:   Behavioral Orders   Procedures     Code 1 - Restrict to Unit     Routine Programming     As clinically indicated     Status 15     Every 15 minutes.     Plan: Patient was given medication information and gave verbal consent. Plan of care was discussed with patient and team. Pt is waiting to hear back from [facility] and [place]. Patient will be placed on a 72 hour hold, if she demands to leave. Continue Hospitalization.  Rationale for change in precautions or plan: N/A

## 2018-05-31 NOTE — PLAN OF CARE
"Problem: Depressive Symptoms  Goal: Depressive Symptoms  Signs and symptoms of listed problems will be absent or manageable.   Outcome: No Change  Patient spent all shift in the lounge watching tv. Sought staff assistance when she had a problem with a peer in the lounge regarding the tv and was happy that she did not \"blow up\" at the peer. Pleasant and more social with staff talking about her former job and family. Awaiting placement.       "

## 2018-06-01 NOTE — PROGRESS NOTES
Participated in Music Therapy group focused on social and emotional skill building through music listening and response/reflection.  Engaged and cooperative.     Engages deeply in the music, evidenced by eyes closed, keeping the beat with hands and feet, swaying.  Appears to process and respond more kinesthetically as opposed to verbally.

## 2018-06-01 NOTE — PLAN OF CARE
Problem: Depressive Symptoms  Goal: Depressive Symptoms  Signs and symptoms of listed problems will be absent or manageable.   Outcome: Therapy, progress toward functional goals is gradual  Pt is attending groups, medication compliant. Pt was watching TV sitting with but not visiting with peers. Pt talked about being frustrated with not being at [facility] by now.

## 2018-06-01 NOTE — PROGRESS NOTES
[facility] Psychiatric Progress Note       Interim History     The patient's care was discussed with the treatment team and chart notes were reviewed. Pt seen on SDU. Staff reports patient was slightly opposition in groups at times, though was attending. Was frustrated about lack of discharge plan to [facility]. He feels he has been through these groups too many times. Notes depression is fine, anxiety is low, energy, appetite, sleep all intact. Is calling [facility] to enquire about dual diagnosis treatment but has not been able to connect yet.      Hospital Course     On May 1, 2018, pt was admitted due to worsening depression with an attempt to hang herself. She was started on a Lamictal titration at 25mg qam. On May 2, 2018, pt reported feeling slightly less depressed with Lamictal. She was encouraged to contact the [facility] in order to defer her upcoming court appearance as she is presently in the hospital. Pt requested being able to use a razor blade to shave, she was informed that no one was allowed to use 2nd to safety concerns for both pt and other patients. Pt was reminded of the serious suicide attempt he made leading to the current hospitalization. (she still has visible raw skin on neck where he attempted to hang herself) Pt wanted to sign out and she was told that she would be put on a 72 hold and commitment filed if she signed a 12 hour intent. On May 9, 2018, pt remains much the same, she is waiting to hear back about insurance to complete application for MA. On May 10, 2018, pt reports she continues to feel hopeless and depressed. She will need to have a Rule 25 assessment completed as she now has MA for insurance. On May 10, 2018, pt reported that she now has MA for insurance and is able to complete a Rule 25 assessment for CD treatment. On May 11, 2018, pt stated she was able to set up an appointment for a Rule 25 assessment next week, encouraged her to engage in an  AA meeting with her peers on the unit. On May 14, 2018, pt reported she had a good meeting with her mother and cousin. She is waiting for her Rule 25 assessment on Wednesday. On May 15, 2018, pt reported feeling less depressed this morning. Will increase Lamictal to 50mg qam. Rule 25 assessment scheduled for tomorrow at 1100. On May 16, 2018, pt went on a pass to have her Rule 25 assessment for CD treatment. On May 17, 2018, pt reported that she completed her assessment at [facility] and will be waiting 1-2 weeks for admission. She is looking forward to treatment. On May 18, 2018, pt is still waiting admission at [facility]. She is pleasant and cooperative on the unit. On May 21, 2018, pt stated that she had been irritable and bored on the unit. She was contacted by [facility] and is waiting to hear back regarding her bed status. On  May 22, 2018, pt was determined to have Medicare due to SSDI, however she had not received payments since 2007. She is attempting to work this out with [facility] and Social Security. On May 23, 2018, pt reported that she was able to resolve her issue with Medicare, she will require a pass to [employer] to complete paperwork. On May 24, 2018, pt reports that she was able to get on the waitlist for [facility] and there will likely be an opening in 1-2 weeks. She will titrate on Lamictal in 5 days.  On May 25, 2018 the patient reported stable progress, no acute symptoms or side effects from medications.  We continue to titrate lamotrigine slowly. On May 29, 2018, pt continues to wait for admission to [facility]. She will increase on Lamictal to 100mg qam tomorrow. On May 30, 2018, she was informed that she was denied from [facility] and is attempting to appeal her denial. The psychiatrist at [facility] will be contact in regards to this denial.     Medications     Current Facility-Administered Medications Ordered in Epic   Medication Dose Route Frequency Last Rate Last Dose     acetaminophen (TYLENOL) tablet 1,000 mg  1,000  "mg Oral Q6H PRN   1,000 mg at 05/05/18 2002     bacitracin-polymyxin b (POLYSPORIN) ointment   Topical BID PRN         hydrOXYzine (ATARAX) tablet 25 mg  25 mg Oral Q4H PRN   25 mg at 05/11/18 1817     lamoTRIgine (LaMICtal) tablet 100 mg  100 mg Oral Daily   100 mg at 06/01/18 0830     loratadine (CLARITIN) tablet 10 mg  10 mg Oral At Bedtime   10 mg at 05/31/18 2118     mirtazapine (REMERON) tablet 45 mg  45 mg Oral At Bedtime   45 mg at 05/31/18 2118     QUEtiapine (SEROquel XR) 24 hr tablet 300 mg  300 mg Oral At Bedtime   300 mg at 05/31/18 2118     QUEtiapine (SEROquel) tablet 50 mg  50 mg Oral TID PRN   50 mg at 05/16/18 1920     sodium chloride (OCEAN) 0.65 % nasal spray 1-2 spray  1-2 spray Nasal Q1H PRN   1 spray at 05/27/18 1743     varenicline (CHANTIX) tablet 1 mg  1 mg Oral BID   1 mg at 06/01/18 0830     No current Epic-ordered outpatient prescriptions on file.         Allergies      No Known Allergies     Medical Review of Systems     /65  Pulse 70  Temp 100  F (37.8  C) (Oral)  Resp 16  Ht 1.651 m (5' 5\")  Wt 60.1 kg (132 lb 6.4 oz)  SpO2 94%  BMI 22.03 kg/m2  Body mass index is 22.03 kg/(m^2).  A 10-point review of systems was performed by Jhon Alvarez MD and is negative, no new findings.      Psychiatric Examination     Appearance Sitting in chair, dressed in casual clothes. Appears stated age. Ligature wound, feminine dress and appearance   Attitude Cooperative   Orientation Oriented to person, place, time   Eye Contact Fair   Speech Regular rate, rhythm, volume and tone   Language Normal   Psychomotor Behavior Normal   Mood Neutral, much less irritable   Affect Constricted range, mild irritability    Thought Process Goal-Oriented, Intact   Associations Intact   Thought Content Patient is currently negative for suicidal ideation, negative for plan or intent, able to contract no self harm and identify barriers to suicide.  Negative for obsessions, compulsions or psychosis.    " "  Fund of Knowledge Average   Insight Improving   Judgement Improving   Attention Span & Concentration Intact   Recent & Remote Memory Fair   Gait Normal   Muscle Tone Intact on visual inspection.        Labs     Labs reviewed.  No results found for this or any previous visit (from the past 24 hour(s)).     Impression     Ms. \"Kippy\" Raheem Jimenez is a 52-year-old  transgender male with a long history of mental health and polysubstance use disorder.  She is awaiting placement, seems to be near baseline, she is attempting to appeal her denial from [facility].   Diagnoses     1. Major depression, recurrent, severe, without psychotic features.  2. Dysthymic disorder.  3. Polysubstance use disorder.  4. Gender dysphoria     Plan     1. Explained side effects, benefits, and complications of medications to the patient, Pt gave verbal consent.  2. Medication changes: Continue current medications.  3. Discussed treatment plan with patient and team.  4. Projected length of stay: Until pt has been stabilized with aftercare in place, waiting for admission to CD treatment, though we will look into dual diagnosis options as [facility] declined given level of mental illness symptoms.   5. [facility] has recommended he be placed on 72 hour hold if pt tries to sign out of hospital based on serious risk of relapse and history of serious suicide attempt recently .        Attestation:   Patient has been seen and evaluated by me, Jhon Alvarez MD.    Patient ID:  Name: Raheem Jimenez  MRN: [id number]  Admission: 4/29/2018   YOB: 1965   "

## 2018-06-01 NOTE — PROGRESS NOTES
[facility] arrived to drop off court paper work for patient, paper work received by staff and given to patient

## 2018-06-01 NOTE — PLAN OF CARE
"Problem: Depressive Symptoms  Goal: Depressive Symptoms  Signs and symptoms of listed problems will be absent or manageable.   Outcome: No Change  Pt presents with flat affect and depressed mood. Pt is feeling down because she found out her wife took out a restraining order against her. During wrap-up group, pt stated that \"I am just trying to stay positive.\"  Pt spent most of the shift keeping to herself, watching TV and talking on the phone.  Towards the end of the shift, pt mood seemed to lift and was seen socializing with peers.        "

## 2018-06-02 NOTE — PLAN OF CARE
Problem: Depressive Symptoms  Goal: Depressive Symptoms  Signs and symptoms of listed problems will be absent or manageable.   Outcome: No Change  Patient spent time in the lounge watching tv. She presented as irritable and more withdrawn today. She only attended community meeting with proper participation.

## 2018-06-02 NOTE — PLAN OF CARE
Problem: Depressive Symptoms  Goal: Depressive Symptoms  Signs and symptoms of listed problems will be absent or manageable.   Outcome: No Change  Pt reports feeling happier.  Says that she has been able to utilize more effective coping strategies with peers that are bothersome.  Frustrated with having to stay through the weekend. Discussed frustrated with being served an order of protection and the her wife gets to keep the dogs.  Hopeful for tx soon.

## 2018-06-03 NOTE — PLAN OF CARE
Problem: Depressive Symptoms  Goal: Depressive Symptoms  Signs and symptoms of listed problems will be absent or manageable.   Outcome: No Change  Pt spent all shift in the lounge watching tv and socializing. She presents as calm in mood with a blunted affect. She is waiting to hear from the treatment center for placement.

## 2018-06-03 NOTE — PROGRESS NOTES
Patient declined offer to meet with psychiatrist today. He states he is doing fine and has no questions or concerns about his plan of care for the weekend. He prefers to wait until Monday and discuss with Dr. Khan when he returns.

## 2018-06-03 NOTE — PLAN OF CARE
"Problem: Depressive Symptoms  Goal: Depressive Symptoms  Signs and symptoms of listed problems will be absent or manageable.   Outcome: No Change  C/o a long boring day.  Was happy a friend visited.  States she is \"Alright.\" Denied SI, depression, or anxiety.  Hopes to hear from [facility] on Monday. Knows she needs to be in a safe place but has nowhere to go.  Stated, \"I'm here, I'm me, living my life for me. Need to be happy. I'm tired of living a lie, over the wife thing, just want to move on with my life.\"      "

## 2018-06-04 NOTE — PLAN OF CARE
Problem: Depressive Symptoms  Goal: Depressive Symptoms  Signs and symptoms of listed problems will be absent or manageable.   Outcome: Improving  Patient attending groups. Making phone calls.  C/O back pain.  Eggcrate mattress ordered.  Awaiting placement into treatment center.

## 2018-06-04 NOTE — PLAN OF CARE
Problem: Depressive Symptoms  Goal: Depressive Symptoms  Signs and symptoms of listed problems will be absent or manageable.   Outcome: No Change  Continues to spend time in the lounge watching tv.  Pt was helpful for getting the wii set up. She was tearful after watching a new show on tv that related to issues she has been dealing with but was glad to watch it.  Hopeful for placement at [facility] soon.

## 2018-06-04 NOTE — PLAN OF CARE
Problem: Patient Care Overview  Goal: Team Discussion  Team Plan:   Outcome: Improving  BEHAVIORAL TEAM DISCUSSION    Participants: Dr. Khan, Nursing staff, , and psych associates  Progress: Improving  Continued Stay Criteria/Rationale: Pt waiting for bed at appropriate treatment center  Medical/Physical: N/A  Precautions:   Behavioral Orders   Procedures     Code 1 - Restrict to Unit     Routine Programming     As clinically indicated     Status 15     Every 15 minutes.     Plan: Improving.  Pt planning on discharging to treatment center.   Rationale for change in precautions or plan: N/A

## 2018-06-04 NOTE — PROGRESS NOTES
[facility] Psychiatric Progress Note       Interim History     The patient's care was discussed with the treatment team and chart notes were reviewed. Pt seen on SDU. Tolerating medications without side effects. Side effects, risks, and benefits of medications reviewed with patient. Denies suicidal or homicidal ideation. Informed pt that the medical director at [facility] was contacted and is waiting for a response. She reports that she had apparently missed a court hearing on Thursday and her wife now has a two year restraining order against her. She plans to continue to work on her own recovery while on the unit.      Hospital Course     On May 1, 2018, pt was admitted due to worsening depression with an attempt to hang herself. She was started on a Lamictal titration at 25mg qam. On May 2, 2018, pt reported feeling slightly less depressed with Lamictal. She was encouraged to contact the [facility] in order to defer her upcoming court appearance as she is presently in the hospital. Pt requested being able to use a razor blade to shave, she was informed that no one was allowed to use 2nd to safety concerns for both pt and other patients. Pt was reminded of the serious suicide attempt he made leading to the current hospitalization. (she still has visible raw skin on neck where he attempted to hang herself) Pt wanted to sign out and she was told that she would be put on a 72 hold and commitment filed if she signed a 12 hour intent. On May 9, 2018, pt remains much the same, she is waiting to hear back about insurance to complete application for MA. On May 10, 2018, pt reports she continues to feel hopeless and depressed. She will need to have a Rule 25 assessment completed as she now has MA for insurance. On May 10, 2018, pt reported that she now has MA for insurance and is able to complete a Rule 25 assessment for CD treatment. On May 11, 2018, pt stated she was able to set up an  appointment for a Rule 25 assessment next week, encouraged her to engage in an AA meeting with her peers on the unit. On May 14, 2018, pt reported she had a good meeting with her mother and cousin. She is waiting for her Rule 25 assessment on Wednesday. On May 15, 2018, pt reported feeling less depressed this morning. Will increase Lamictal to 50mg qam. Rule 25 assessment scheduled for tomorrow at 1100. On May 16, 2018, pt went on a pass to have her Rule 25 assessment for CD treatment. On May 17, 2018, pt reported that she completed her assessment at [facility] and will be waiting 1-2 weeks for admission. She is looking forward to treatment. On May 18, 2018, pt is still waiting admission at [facility]. She is pleasant and cooperative on the unit. On May 21, 2018, pt stated that she had been irritable and bored on the unit. She was contacted by [facility] and is waiting to hear back regarding her bed status. On  May 22, 2018, pt was determined to have Medicare due to SSDI, however she had not received payments since 2007. She is attempting to work this out with [facility] and Social Security. On May 23, 2018, pt reported that she was able to resolve her issue with Medicare, she will require a pass to [facility] to complete paperwork. On May 24, 2018, pt reports that she was able to get on the waitlist for [facility] and there will likely be an opening in 1-2 weeks. She will titrate on Lamictal in 5 days.  On May 25, 2018 the patient reported stable progress, no acute symptoms or side effects from medications.  We continue to titrate lamotrigine slowly. On May 29, 2018, pt continues to wait for admission to [facility]. She will increase on Lamictal to 100mg qam tomorrow. On May 30, 2018, she was informed that she was denied from [facility] and is attempting to appeal her denial. The psychiatrist at [facility] will be contact in regards to this denial. On June 4, 2018, pt reported that she is still waiting to hear back from [facility] regarding treatment, she  "is otherwise feeling okay on the unit.     Medications     Current Facility-Administered Medications Ordered in Epic   Medication Dose Route Frequency Last Rate Last Dose     acetaminophen (TYLENOL) tablet 1,000 mg  1,000 mg Oral Q6H PRN   1,000 mg at 05/05/18 2002     bacitracin-polymyxin b (POLYSPORIN) ointment   Topical BID PRN         lamoTRIgine (LaMICtal) tablet 100 mg  100 mg Oral Daily   100 mg at 06/04/18 0803     loratadine (CLARITIN) tablet 10 mg  10 mg Oral At Bedtime   10 mg at 06/03/18 2148     mirtazapine (REMERON) tablet 45 mg  45 mg Oral At Bedtime   45 mg at 06/03/18 2148     QUEtiapine (SEROquel XR) 24 hr tablet 300 mg  300 mg Oral At Bedtime   300 mg at 06/03/18 2148     QUEtiapine (SEROquel) tablet 50 mg  50 mg Oral TID PRN   50 mg at 05/16/18 1920     sodium chloride (OCEAN) 0.65 % nasal spray 1-2 spray  1-2 spray Nasal Q1H PRN   2 spray at 06/03/18 1454     varenicline (CHANTIX) tablet 1 mg  1 mg Oral BID   1 mg at 06/04/18 0803     No current [facility]-ordered outpatient prescriptions on file.         Allergies      No Known Allergies     Medical Review of Systems     /78  Pulse 69  Temp 97.5  F (36.4  C) (Oral)  Resp 16  Ht 1.651 m (5' 5\")  Wt 60.1 kg (132 lb 6.4 oz)  SpO2 94%  BMI 22.03 kg/m2  Body mass index is 22.03 kg/(m^2).  A 10-point review of systems was performed by Steve Khan MD and is negative, no new findings.      Psychiatric Examination     Appearance Sitting in chair, dressed in casual clothes. Appears stated age. Ligature wound, feminine dress and appearance   Attitude Cooperative   Orientation Oriented to person, place, time   Eye Contact Fair   Speech Regular rate, rhythm, volume and tone   Language Normal   Psychomotor Behavior Normal   Mood Neutral, much less irritable   Affect Constricted range, mild irritability    Thought Process Linear   Associations Intact   Thought Content Patient is currently negative for suicidal ideation, negative for plan or " "intent, able to contract no self harm and identify barriers to suicide.  Negative for obsessions, compulsions or psychosis.      Fund of Knowledge Average   Insight Improving   Judgement Improving   Attention Span & Concentration Intact   Recent & Remote Memory Fair   Gait Normal   Muscle Tone Intact on visual inspection.        Labs     Labs reviewed.  No results found for this or any previous visit (from the past 24 hour(s)).     Impression     Ms. \"Kippy\" Raheem Jimenez is a 52-year-old  transgender male with a long history of mental health and polysubstance use disorder.  She is awaiting placement, seems to be near baseline, she is attempting to appeal her denial from [facility].   Diagnoses     1. Major depression, recurrent, severe, without psychotic features.  2. Dysthymic disorder.  3. Polysubstance use disorder.  4. Gender dysphoria     Plan     1. Explained side effects, benefits, and complications of medications to the patient, Pt gave verbal consent.  2. Medication changes: Continue current medications.  3. Discussed treatment plan with patient and team.  4. Projected length of stay: Until pt has been stabilized with aftercare in place, waiting for admission to CD treatment, though we will look into dual diagnosis options as [facility] declined given level of mental illness symptoms.   5. [facility] has recommended she be placed on 72 hour hold if pt tries to sign out of hospital based on serious risk of relapse and history of serious suicide attempt recently .        Attestation:   Patient has been seen and evaluated by me, Steve Khan MD.    Patient ID:  Name: Raheem Jimenez  MRN: [id number]  Admission: 4/29/2018   YOB: 1965   "

## 2018-06-05 NOTE — PLAN OF CARE
Problem: Depressive Symptoms  Goal: Depressive Symptoms  Signs and symptoms of listed problems will be absent or manageable.   Pt presents with a full range affect and calm mood.  Pt attended morning groups and participated appropriately.  Pt got accepted into [facility] today and has been pleasant. Denies SI.

## 2018-06-05 NOTE — PLAN OF CARE
Problem: Depressive Symptoms  Goal: Depressive Symptoms  Signs and symptoms of listed problems will be absent or manageable.   Outcome: No Change  Pt spent all shift in the lounge watching tv and socializing with peers. She observed to be more irritable and was observed gossiping about peers and staff with a fellow peer. She attended wrap up group with proper participation.

## 2018-06-05 NOTE — DISCHARGE INSTRUCTIONS
Behavioral Discharge Planning and Instructions    Summary: Admitted following suicide attempt.    Main Diagnosis: Major depression; Dysthymic disorder;Polysubstance use disorder     Major Treatments, Procedures and Findings: Psychiatric assessment; Rule 25 assessment    Symptoms to Report: feeling more aggressive, mood getting worse or thoughts of suicide    Lifestyle Adjustment: Sober lifestyle; complete treatment and engage in aftercare.Follow up with mental health medication management and supportive therapy.    Psychiatry Follow-up:     Dr. Khan  -   Call for appointment as you near end of treatment stay.   [facility]  [address] [facility], [address]  [place]  [address]  Phone:  [phone]  Fax:  [phone]    [facility]- entry date 6/6/18 @ 12 noon.  [address]  [place] [id number] [phone] fax: [phone]    Resources:   [facility] Service- [phone]  Crisis Intervention: 176.997.5831 or [phone] (TTY: [phone]).  Call anytime for help.  National [place] on Mental Illness (www.[place].kita.org): 110.660.1527 or 959-340-7536.  Alcoholics Anonymous (www.alcoholics-anonymous.org): Check your phone book for your local chapter.  National Suicide Prevention Line (www.mentalhealthmn.org): 525-352-PNPO (0884)    General Medication Instructions:   See your medication sheet(s) for instructions.   Take all medicines as directed.  Make no changes unless your doctor suggests them.   Go to all your doctor visits.  Be sure to have all your required lab tests. This way, your medicines can be refilled on time.  Do not use any drugs not prescribed by your doctor.  Avoid alcohol.

## 2018-06-05 NOTE — PROGRESS NOTES
[facility] Psychiatric Progress Note       Interim History     The patient's care was discussed with the treatment team and chart notes were reviewed. Pt seen on SDU. Tolerating medications without side effects. Side effects, risks, and benefits of medications reviewed with patient. Denies suicidal or homicidal ideation. Pt was informed that she has been admitted to [place] and is excited to discharge to the facility. She has intake scheduled at noon tomorrow. 30 day supply of medication provided at time of discharge. Will increase Lamictal to 150mg qam, recommend that eventually be titrated to 200mg qam.      Hospital Course     On May 1, 2018, pt was admitted due to worsening depression with an attempt to hang herself. She was started on a Lamictal titration at 25mg qam. On May 2, 2018, pt reported feeling slightly less depressed with Lamictal. She was encouraged to contact the [facility] in order to defer her upcoming court appearance as she is presently in the hospital. Pt requested being able to use a razor blade to shave, she was informed that no one was allowed to use 2nd to safety concerns for both pt and other patients. Pt was reminded of the serious suicide attempt he made leading to the current hospitalization. (she still has visible raw skin on neck where he attempted to hang herself) Pt wanted to sign out and she was told that she would be put on a 72 hold and commitment filed if she signed a 12 hour intent. On May 9, 2018, pt remains much the same, she is waiting to hear back about insurance to complete application for MA. On May 10, 2018, pt reports she continues to feel hopeless and depressed. She will need to have a Rule 25 assessment completed as she now has MA for insurance. On May 10, 2018, pt reported that she now has MA for insurance and is able to complete a Rule 25 assessment for CD treatment. On May 11, 2018, pt stated she was able to set up an appointment  for a Rule 25 assessment next week, encouraged her to engage in an AA meeting with her peers on the unit. On May 14, 2018, pt reported she had a good meeting with her mother and cousin. She is waiting for her Rule 25 assessment on Wednesday. On May 15, 2018, pt reported feeling less depressed this morning. Will increase Lamictal to 50mg qam. Rule 25 assessment scheduled for tomorrow at 1100. On May 16, 2018, pt went on a pass to have her Rule 25 assessment for CD treatment. On May 17, 2018, pt reported that she completed her assessment at [facility] and will be waiting 1-2 weeks for admission. She is looking forward to treatment. On May 18, 2018, pt is still waiting admission at [facility]. She is pleasant and cooperative on the unit. On May 21, 2018, pt stated that she had been irritable and bored on the unit. She was contacted by [facility] and is waiting to hear back regarding her bed status. On  May 22, 2018, pt was determined to have Medicare due to SSDI, however she had not received payments since 2007. She is attempting to work this out with [facility] and Social Security. On May 23, 2018, pt reported that she was able to resolve her issue with Medicare, she will require a pass to [facility] to complete paperwork. On May 24, 2018, pt reports that she was able to get on the waitlist for [facility] and there will likely be an opening in 1-2 weeks. She will titrate on Lamictal in 5 days.  On May 25, 2018 the patient reported stable progress, no acute symptoms or side effects from medications.  We continue to titrate lamotrigine slowly. On May 29, 2018, pt continues to wait for admission to [facility]. She will increase on Lamictal to 100mg qam tomorrow. On May 30, 2018, she was informed that she was denied from [facility] and is attempting to appeal her denial. The psychiatrist at [facility] will be contact in regards to this denial. On June 4, 2018, pt reported that she is still waiting to hear back from [facility] regarding treatment, she is otherwise  "feeling okay on the unit. On June 5, 2018, pt received news that she was accepted to [facility] and she will discharge to the facility tomorrow. Lamictal will be increased to 150mg qam.     Medications     Current Facility-Administered Medications Ordered in Epic   Medication Dose Route Frequency Last Rate Last Dose     acetaminophen (TYLENOL) tablet 1,000 mg  1,000 mg Oral Q6H PRN   1,000 mg at 05/05/18 2002     bacitracin-polymyxin b (POLYSPORIN) ointment   Topical BID PRN         lamoTRIgine (LaMICtal) tablet 100 mg  100 mg Oral Daily   100 mg at 06/05/18 0900     loratadine (CLARITIN) tablet 10 mg  10 mg Oral At Bedtime   10 mg at 06/04/18 2154     mirtazapine (REMERON) tablet 45 mg  45 mg Oral At Bedtime   45 mg at 06/04/18 2154     QUEtiapine (SEROquel XR) 24 hr tablet 300 mg  300 mg Oral At Bedtime   300 mg at 06/04/18 2154     QUEtiapine (SEROquel) tablet 50 mg  50 mg Oral TID PRN   50 mg at 05/16/18 1920     sodium chloride (OCEAN) 0.65 % nasal spray 1-2 spray  1-2 spray Nasal Q1H PRN   2 spray at 06/03/18 1454     varenicline (CHANTIX) tablet 1 mg  1 mg Oral BID   1 mg at 06/05/18 0900     No current [facility]-ordered outpatient prescriptions on file.         Allergies      No Known Allergies     Medical Review of Systems     /69  Pulse 75  Temp 99  F (37.2  C) (Oral)  Resp 16  Ht 1.651 m (5' 5\")  Wt 59.5 kg (131 lb 3.2 oz)  SpO2 94%  BMI 21.83 kg/m2  Body mass index is 21.83 kg/(m^2).  A 10-point review of systems was performed by Steve Khan MD and is negative, no new findings.      Psychiatric Examination     Appearance Sitting in chair, dressed in casual clothes. Appears stated age. Ligature wound is healing, feminine dress and appearance   Attitude Cooperative   Orientation Oriented to person, place, time   Eye Contact Fair   Speech Regular rate, rhythm, volume and tone   Language Normal   Psychomotor Behavior Normal   Mood Neutral, much less irritable   Affect Broader range   Thought " "Process Linear   Associations Intact   Thought Content Patient is currently negative for suicidal ideation, negative for plan or intent, able to contract no self harm and identify barriers to suicide.  Negative for obsessions, compulsions or psychosis.      Fund of Knowledge Average   Insight Improving   Judgement Fair   Attention Span & Concentration Intact   Recent & Remote Memory Fair   Gait Normal   Muscle Tone Intact on visual inspection.        Labs     Labs reviewed.  No results found for this or any previous visit (from the past 24 hour(s)).     Impression     Ms. \"Kippy\" Raheem Jimenez is a 52-year-old  transgender male with a long history of mental health and polysubstance use disorder.  She will discharge to [facility] tomorrow.     Diagnoses     1. Major depression, recurrent, severe, without psychotic features.  2. Dysthymic disorder.  3. Polysubstance use disorder.  4. Gender dysphoria     Plan     1. Explained side effects, benefits, and complications of medications to the patient, Pt gave verbal consent.  2. Medication changes: Increase Lamictal to 150mg qam, one time dose of 50mg today.  3. Discussed treatment plan with patient and team.  4. Projected length of stay: Pt to discharge to [facility] tomorrow.  5. 30 day supply of medication provided at time of discharge.           Attestation:   Patient has been seen and evaluated by me, Steve Khan MD.    Patient ID:  Name: Raheem Jimenez  MRN: [id number]  Admission: 4/29/2018   YOB: 1965   "

## 2018-06-05 NOTE — PLAN OF CARE
Problem: Patient Care Overview  Goal: Discharge Needs Assessment   received a call from Cecilia in admissions at [facility] ([phone]). She stated that there had been a psychiatrist to psychiatrist meeting regarding the appeal of admission, and patient has now been accepted for admission to [place]. She stated that there will be a bed available tomorrow and patient will need to arrive by 12:00 pm. [facility] [place] does not provide transportation so patient will need to make her own arrangement. They are requesting a 30 day supply of medications ordered for patient. Medications can either be filled at the hospital or at [facility] ([phone] / [phone] fax) which will deliver to [place].

## 2018-06-05 NOTE — PROGRESS NOTES
Met with patient with Dr. Khan. Patient is pleased with admission to [facility]; is to be there by noon tomorrow. She reports her cousin will bring her from hospital to her car and she will be at [facility] at noon. Dr. Khan ordering medications from hospital pharmacy today.

## 2018-06-06 NOTE — PLAN OF CARE
Problem: Depressive Symptoms  Goal: Depressive Symptoms  Signs and symptoms of listed problems will be absent or manageable.   Outcome: Improving  Medications received from [facility] discharge pharmacy in preparation for pt to discharge tomorrow morning; meds reviewed with pt and then placed in pt's locker. Pt watched TV with peers this evening in lounge; attended Wrap-up group.

## 2018-06-06 NOTE — PROGRESS NOTES
Patient discharged denies suicidal ideation and has stable mood. Instructions gone over with patient regarding medications and follow up plan.  . Copies of discharge instructions ( After Visit Summary) given to patient. Pt has a thirty day supply of medications and a friend is here to take her to [facility]. Pt left the unit at 0915

## 2018-06-06 NOTE — PROGRESS NOTES
[facility] Psychiatric Progress Note       Interim History     The patient's care was discussed with the treatment team and chart notes were reviewed. Pt seen on SDU. Tolerating medications without side effects. Side effects, risks, and benefits of medications reviewed with patient. Denies suicidal or homicidal ideation. Pt will discharge to [place] today and is looking forward to treatment. Denies suicidal or homicidal ideation. 30 day supply of medication provided at time of discharge. She will follow up with a provider during treatment at [facility].     Hospital Course     On May 1, 2018, pt was admitted due to worsening depression with an attempt to hang herself. She was started on a Lamictal titration at 25mg qam. On May 2, 2018, pt reported feeling slightly less depressed with Lamictal. She was encouraged to contact the [facility] in order to defer her upcoming court appearance as she is presently in the hospital. Pt requested being able to use a razor blade to shave, she was informed that no one was allowed to use 2nd to safety concerns for both pt and other patients. Pt was reminded of the serious suicide attempt he made leading to the current hospitalization. (she still has visible raw skin on neck where he attempted to hang herself) Pt wanted to sign out and she was told that she would be put on a 72 hold and commitment filed if she signed a 12 hour intent. On May 9, 2018, pt remains much the same, she is waiting to hear back about insurance to complete application for MA. On May 10, 2018, pt reports she continues to feel hopeless and depressed. She will need to have a Rule 25 assessment completed as she now has MA for insurance. On May 10, 2018, pt reported that she now has MA for insurance and is able to complete a Rule 25 assessment for CD treatment. On May 11, 2018, pt stated she was able to set up an appointment for a Rule 25 assessment next week, encouraged her to  engage in an AA meeting with her peers on the unit. On May 14, 2018, pt reported she had a good meeting with her mother and cousin. She is waiting for her Rule 25 assessment on Wednesday. On May 15, 2018, pt reported feeling less depressed this morning. Will increase Lamictal to 50mg qam. Rule 25 assessment scheduled for tomorrow at 1100. On May 16, 2018, pt went on a pass to have her Rule 25 assessment for CD treatment. On May 17, 2018, pt reported that she completed her assessment at [facility] and will be waiting 1-2 weeks for admission. She is looking forward to treatment. On May 18, 2018, pt is still waiting admission at [facility]. She is pleasant and cooperative on the unit. On May 21, 2018, pt stated that she had been irritable and bored on the unit. She was contacted by [facility] and is waiting to hear back regarding her bed status. On  May 22, 2018, pt was determined to have Medicare due to SSDI, however she had not received payments since 2007. She is attempting to work this out with [facility] and Social Security. On May 23, 2018, pt reported that she was able to resolve her issue with Medicare, she will require a pass to [facility] to complete paperwork. On May 24, 2018, pt reports that she was able to get on the waitlist for [facility] and there will likely be an opening in 1-2 weeks. She will titrate on Lamictal in 5 days.  On May 25, 2018 the patient reported stable progress, no acute symptoms or side effects from medications.  We continue to titrate lamotrigine slowly. On May 29, 2018, pt continues to wait for admission to [facility]. She will increase on Lamictal to 100mg qam tomorrow. On May 30, 2018, she was informed that she was denied from [facility] and is attempting to appeal her denial. The psychiatrist at [facility] will be contact in regards to this denial. On June 4, 2018, pt reported that she is still waiting to hear back from [facility] regarding treatment, she is otherwise feeling okay on the unit. On June 5, 2018, pt  "received news that she was accepted to [place] and she will discharge to the facility tomorrow. Lamictal will be increased to 150mg qam. On June 6, 2018, pt was discharged to [facility] for treatment. 30 day supply of medication provided at time of discharge. Denies suicidal or homicidal ideation.      Medications     Current Facility-Administered Medications Ordered in Epic   Medication Dose Route Frequency Last Rate Last Dose     acetaminophen (TYLENOL) tablet 1,000 mg  1,000 mg Oral Q6H PRN   1,000 mg at 05/05/18 2002     bacitracin-polymyxin b (POLYSPORIN) ointment   Topical BID PRN         lamoTRIgine (LaMICtal) tablet 150 mg  150 mg Oral Daily   150 mg at 06/06/18 0804     loratadine (CLARITIN) tablet 10 mg  10 mg Oral At Bedtime   10 mg at 06/05/18 2203     mirtazapine (REMERON) tablet 45 mg  45 mg Oral At Bedtime   45 mg at 06/05/18 2203     QUEtiapine (SEROquel XR) 24 hr tablet 300 mg  300 mg Oral At Bedtime   300 mg at 06/05/18 2203     QUEtiapine (SEROquel) tablet 50 mg  50 mg Oral TID PRN   50 mg at 05/16/18 1920     sodium chloride (OCEAN) 0.65 % nasal spray 1-2 spray  1-2 spray Nasal Q1H PRN   2 spray at 06/03/18 1454     varenicline (CHANTIX) tablet 1 mg  1 mg Oral BID   1 mg at 06/06/18 0804     Current Outpatient Prescriptions Ordered in Epic   Medication     lamoTRIgine (LAMICTAL) 150 MG tablet     mirtazapine (REMERON) 45 MG tablet     QUEtiapine (SEROQUEL XR) 300 MG 24 hr tablet     varenicline (CHANTIX) 1 MG tablet         Allergies      No Known Allergies     Medical Review of Systems     /70  Pulse 75  Temp 98.7  F (37.1  C) (Oral)  Resp 17  Ht 1.651 m (5' 5\")  Wt 59.5 kg (131 lb 3.2 oz)  SpO2 94%  BMI 21.83 kg/m2  Body mass index is 21.83 kg/(m^2).  A 10-point review of systems was performed by Steve Khan MD and is negative, no new findings.      Psychiatric Examination     Appearance Sitting in chair, dressed in casual clothes. Appears stated age. Ligature wound is healing, " "feminine dress and appearance   Attitude Cooperative   Orientation Oriented to person, place, time   Eye Contact Good   Speech Regular rate, rhythm, volume and tone   Language Normal   Psychomotor Behavior Normal   Mood Brighter   Affect Broader range   Thought Process Linear   Associations Intact   Thought Content Patient is currently negative for suicidal ideation, negative for plan or intent, able to contract no self harm and identify barriers to suicide.  Negative for obsessions, compulsions or psychosis.      Fund of Knowledge Average   Insight Fair   Judgement Fair   Attention Span & Concentration Intact   Recent & Remote Memory Fair   Gait Normal   Muscle Tone Intact on visual inspection.        Labs     Labs reviewed.  No results found for this or any previous visit (from the past 24 hour(s)).     Impression     Ms. \"Kippy\" Raheem Jimenez is a 52-year-old  transgender male with a long history of mental health and polysubstance use disorder.  She will discharge to [facility] today for treatment.     Diagnoses     1. Major depression, recurrent, severe, without psychotic features.  2. Dysthymic disorder.  3. Polysubstance use disorder.  4. Gender dysphoria     Plan     1. Explained side effects, benefits, and complications of medications to the patient, Pt gave verbal consent.  2. Medication changes: Continue current medications.  3. Discussed treatment plan with patient and team.  4. Projected length of stay: Pt to discharge to [facility].  5. 30 day supply of medication provided at time of discharge.       Attestation:   Patient has been seen and evaluated by me, Steve Khan MD.    Patient ID:  Name: Raheem Jimenez  MRN: [id number]  Admission: 4/29/2018   YOB: 1965   "

## 2018-06-18 NOTE — DISCHARGE SUMMARY
"[facility] Psychiatric Discharge Summary      DATE OF ADMISSION: 4/29/2018     DATE OF DISCHARGE: 6/6/2018    PRIMARY CARE PHYSICIAN: Julia Burgess    IDENTIFICATION: Patient is a 52 year old  transgender male who identifies as female and currently homeless. Pt has been followed by Dr. Khan for the past eight years at [facility].  For history, see dictation by Dr. Khan on 5/1/18. For physical summary, see dictation by MEKHI Mcmahon CNP on 4/30/18.     HOSPITAL COURSE: Ms. \"Kippy\" Raheem Jimenez is a 52 year old transgender male to female who presents to the [facility] ED after attempting to hang herself from a tree. For further history, please refer to Dr. Khan's dictation on 4/2/18. She was eventually stabilized and discharged to [facility] on 4/11/18. Pt reported that she had transferred to [facility] after her insurance did not cover the full thirty days of treatment at [facility]. She did not feel comfortable at the sober house she was living at. She was eventually removed from the sober house as her utox was positive for marijuana since she had only been at treatment for 26 days. She was recently at [facility] and felt severely depressed. She states that she was driving around when she decided to hang herself from a tree. She was able to find a rope and tie a noose from a tree in [place]. She states that she had been hanging for approximately 15-20 minutes when she decided against this attempt and unwrapped herself from the rope. She presented to the ED voluntarily. CT imaging revealed no evidence of vascular injury or acute fracture. There is evidence of inflammation in the right neck area. She denies recent chemical use. Pt has severely depressed mood, motivation poor, concentration poor, low energy, hopeless, helpless, and worthless with SI, no plan, able to contract for safety. She denies symptoms of jessica or psychosis. "     On May 1, 2018, pt was admitted due to worsening depression with an attempt to hang herself. She was started on a Lamictal titration at 25mg qam. On May 2, 2018, pt reported feeling slightly less depressed with Lamictal. She was encouraged to contact the [facility] in order to defer her upcoming court appearance as she is presently in the hospital. Pt requested being able to use a razor blade to shave, she was informed that no one was allowed to use 2nd to safety concerns for both pt and other patients. Pt was reminded of the serious suicide attempt he made leading to the current hospitalization. (she still has visible raw skin on neck where he attempted to hang herself) Pt wanted to sign out and she was told that she would be put on a 72 hold and commitment filed if she signed a 12 hour intent. On May 9, 2018, pt remains much the same, she is waiting to hear back about insurance to complete application for MA. On May 10, 2018, pt reports she continues to feel hopeless and depressed. She will need to have a Rule 25 assessment completed as she now has MA for insurance. On May 10, 2018, pt reported that she now has MA for insurance and is able to complete a Rule 25 assessment for CD treatment. On May 11, 2018, pt stated she was able to set up an appointment for a Rule 25 assessment next week, encouraged her to engage in an AA meeting with her peers on the unit. On May 14, 2018, pt reported she had a good meeting with her mother and cousin. She is waiting for her Rule 25 assessment on Wednesday. On May 15, 2018, pt reported feeling less depressed this morning. Will increase Lamictal to 50mg qam. Rule 25 assessment scheduled for tomorrow at 1100. On May 16, 2018, pt went on a pass to have her Rule 25 assessment for CD treatment. On May 17, 2018, pt reported that she completed her assessment at [facility] and will be waiting 1-2 weeks for admission. She is looking forward to treatment. On May 18, 2018,  pt is still waiting admission at [facility]. She is pleasant and cooperative on the unit. On May 21, 2018, pt stated that she had been irritable and bored on the unit. She was contacted by [facility] and is waiting to hear back regarding her bed status. On  May 22, 2018, pt was determined to have Medicare due to SSDI, however she had not received payments since 2007. She is attempting to work this out with [facility] and Social Security. On May 23, 2018, pt reported that she was able to resolve her issue with Medicare, she will require a pass to [facility] to complete paperwork. On May 24, 2018, pt reports that she was able to get on the waitlist for [facility] and there will likely be an opening in 1-2 weeks. She will titrate on Lamictal in 5 days.  On May 25, 2018 the patient reported stable progress, no acute symptoms or side effects from medications.  We continue to titrate lamotrigine slowly. On May 29, 2018, pt continues to wait for admission to [facility]. She will increase on Lamictal to 100mg qam tomorrow. On May 30, 2018, she was informed that she was denied from [facility] and is attempting to appeal her denial. The psychiatrist at [facility] will be contact in regards to this denial. On June 4, 2018, pt reported that she is still waiting to hear back from [facility] regarding treatment, she is otherwise feeling okay on the unit. On June 5, 2018, pt received news that she was accepted to [facility] and she will discharge to the facility tomorrow. Lamictal will be increased to 150mg qam. On June 6, 2018, pt was discharged to [facility] for treatment. 30 day supply of medication provided at time of discharge. Denies suicidal or homicidal ideation.     DISCHARGE MENTAL STATUS EXAMINATION:    Appearance Sitting in chair, dressed in casual clothes. Appears stated age. Ligature wound is healing, feminine dress and appearance   Attitude Cooperative   Orientation Oriented to person, place, time   Eye Contact Good   Speech Regular rate, rhythm, volume and tone  "  Language Normal   Psychomotor Behavior Normal   Mood Brighter   Affect Broader range   Thought Process Linear   Associations Intact   Thought Content Patient is currently negative for suicidal ideation, negative for plan or intent, able to contract no self harm and identify barriers to suicide.  Negative for obsessions, compulsions or psychosis.      Fund of Knowledge Average   Insight Fair   Judgement Fair   Attention Span & Concentration Intact   Recent & Remote Memory Fair   Gait Normal   Muscle Tone Intact on visual inspection.         LABORATORY DATA:    Refer to hospitalist admission dictation.  No results found for this or any previous visit (from the past 24 hour(s)).     /70  Pulse 75  Temp 98.7  F (37.1  C) (Oral)  Resp 17  Ht 1.651 m (5' 5\")  Wt 59.5 kg (131 lb 3.2 oz)  SpO2 94%  BMI 21.83 kg/m2     DISCHARGE MEDICATIONS:      Review of your medicines      START taking       Dose / Directions    lamoTRIgine 150 MG tablet   Commonly known as:  LaMICtal   Used for:  Severe recurrent major depression without psychotic features (H)        Dose:  150 mg   Take 1 tablet (150 mg) by mouth daily   Quantity:  30 tablet   Refills:  0         CONTINUE these medicines which may have CHANGED, or have new prescriptions. If we are uncertain of the size of tablets/capsules you have at home, strength may be listed as something that might have changed.       Dose / Directions    QUEtiapine 300 MG 24 hr tablet   Commonly known as:  SEROquel XR   This may have changed:  Another medication with the same name was removed. Continue taking this medication, and follow the directions you see here.   Used for:  Severe single current episode of major depressive disorder, without psychotic features (H)        Dose:  300 mg   Take 1 tablet (300 mg) by mouth At Bedtime   Quantity:  30 tablet   Refills:  0         CONTINUE these medicines which have NOT CHANGED       Dose / Directions    mirtazapine 45 MG tablet "   Commonly known as:  REMERON   Used for:  Severe single current episode of major depressive disorder, without psychotic features (H)        Dose:  45 mg   Take 1 tablet (45 mg) by mouth At Bedtime   Quantity:  30 tablet   Refills:  0       sodium chloride 0.65 % nasal spray   Commonly known as:  OCEAN   Used for:  Dry skin        Dose:  1-2 spray   Spray 1-2 sprays in nostril every hour as needed for other (dry nasal passages)   Quantity:  30 mL   Refills:  0       varenicline 1 MG tablet   Commonly known as:  CHANTIX   Used for:  Tobacco abuse        Dose:  1 mg   Take 1 tablet (1 mg) by mouth 2 times daily   Quantity:  60 tablet   Refills:  0            Where to get your medicines      These medications were sent to [facility] - [address]  [address] [place] [address]     Phone:  [phone]      lamoTRIgine 150 MG tablet     mirtazapine 45 MG tablet     QUEtiapine 300 MG 24 hr tablet     varenicline 1 MG tablet             DISCHARGE DIAGNOSES:    1. Major depression, recurrent, severe, without psychotic features.  2. Dysthymic disorder.  3. Polysubstance use disorder.  4. Gender dysphoria    DISCHARGE PLAN:     1. Explained side effects, benefits, and complications of medications to the patient, Pt gave verbal consent.  2. Medication changes: Continue current medications.  3. Discussed treatment plan with patient and team.  4. Projected length of stay: Pt to discharge to [facility].  5. 30 day supply of medication provided at time of discharge.     DISCHARGE FOLLOW-UP:    Psychiatry Follow-up:      Dr. Khan  -   Call for appointment as you near end of treatment stay.   [facility]  [address], [address]  [place]  [address]  Phone:  [phone]  Fax:  [phone]     [facility]- entry date 6/6/18 @ 12 noon.  [address]  [place] [id number] [phone] fax: [phone]    Attestation:   Patient has been seen and  evaluated by me, Steve Khan MD.    Patient ID:    Name: Raheem Jimenez  MRN: [id number]  Admission: 4/29/2018   YOB: 1965

## 2018-11-09 NOTE — PLAN OF CARE
"Problem: Patient Care Overview  Goal: Discharge Needs Assessment  Patient attended Process Group and participated. Patient demonstrated emerging insight into the topic of discussion. Her responses remain superficial and at times somewhat focused on the \"shock\" value of describing her suicide attempt, rather than processing the events that led her to wanting to attempt suicide.         " Problem: Falls - Risk of:  Goal: Will remain free from falls  Will remain free from falls   Outcome: Ongoing    Goal: Absence of physical injury  Absence of physical injury   Outcome: Ongoing

## 2021-05-24 ENCOUNTER — RECORDS - HEALTHEAST (OUTPATIENT)
Dept: ADMINISTRATIVE | Facility: CLINIC | Age: 56
End: 2021-05-24

## 2021-05-25 ENCOUNTER — RECORDS - HEALTHEAST (OUTPATIENT)
Dept: ADMINISTRATIVE | Facility: CLINIC | Age: 56
End: 2021-05-25

## 2024-02-10 NOTE — PLAN OF CARE
Problem: Depressive Symptoms  Goal: Depressive Symptoms  Signs and symptoms of listed problems will be absent or manageable.   Outcome: No Change  Pt attended groups and participated appropriately.  Blunt affect, mostly watching tv throughout the day.  Pt is waiting for a bed at [facility].  Calm and cooperative.  Pleasant wit staff and peers.       Hemianopsia NSVT (nonsustained ventricular tachycardia) NSVT (nonsustained ventricular tachycardia) NSVT (nonsustained ventricular tachycardia) NSVT (nonsustained ventricular tachycardia) NSVT (nonsustained ventricular tachycardia) NSVT (nonsustained ventricular tachycardia) Hemianopsia Hemianopsia Hemianopsia NSVT (nonsustained ventricular tachycardia)

## 2024-07-17 NOTE — PLAN OF CARE
"Pt present on unit, attending groups but keeping to self. Denies current SI, states \"I'm just working on feeling less depressed and staying focused on what matters. Sometimes it's just so hard when there's so much going on at once.\" States she is thankful to be here and continues to feel better. Polysporin ointment applied to neck wound. Skin intact with some slough, no warmth, drainage or redness noted. Pt denies pain.   " No PAP repeated, had phx only   Pap is normal. Repeat in 3 years.   Written by Becka Cruz CMA on 12/29/2020  5:17 PM CST  Seen by patient Jose Alberto Ferriera on 10/1/2021 10:50 AMPlease review.Protocol failed/ No protocol      Requested Prescriptions   Pending Prescriptions Disp Refills    Norgestim-Eth Estrad Triphasic (TRI-SPRINTEC) 0.18/0.215/0.25 MG-35 MCG Oral Tab 84 tablet 3     Sig: Take 1 tablet by mouth daily.       Gynecology Medication Protocol Passed - 7/16/2024  4:20 PM        Passed - PASS-PENDING LAST PAP WNL--VIA MANUAL LOOKUP        Passed - Physical or Pelvic/Breast in past 12 or next 3 mos--VIA MANUAL LOOKUP             Future Appointments         Provider Department Appt Notes    In 1 week [facility]              Recent Outpatient Visits              6 days ago Encounter for wellness examination in adult    [facility], Jarek Silva APRN    Office Visit    1 month ago [facility] of chlamydia infection    [facility]    Office Visit    2 months ago Conductive hearing loss of left ear with unrestricted hearing of right ear    [facility] [place] Au[facility]    Office Visit    2 months ago Hearing loss, unspecified hearing loss type, unspecified laterality    [facility]    Office Visit    2 months ago Left non-suppurative otitis media    [facility]    Office Visit